# Patient Record
Sex: FEMALE | Race: WHITE | NOT HISPANIC OR LATINO | Employment: OTHER | URBAN - NONMETROPOLITAN AREA
[De-identification: names, ages, dates, MRNs, and addresses within clinical notes are randomized per-mention and may not be internally consistent; named-entity substitution may affect disease eponyms.]

---

## 2019-04-24 ENCOUNTER — TELEPHONE (OUTPATIENT)
Dept: CARDIOLOGY | Facility: CLINIC | Age: 84
End: 2019-04-24

## 2019-04-24 ENCOUNTER — OFFICE VISIT (OUTPATIENT)
Dept: CARDIOLOGY | Facility: CLINIC | Age: 84
End: 2019-04-24

## 2019-04-24 VITALS
BODY MASS INDEX: 24.17 KG/M2 | HEART RATE: 76 BPM | OXYGEN SATURATION: 96 % | DIASTOLIC BLOOD PRESSURE: 82 MMHG | WEIGHT: 128 LBS | SYSTOLIC BLOOD PRESSURE: 174 MMHG | HEIGHT: 61 IN

## 2019-04-24 DIAGNOSIS — R00.2 PALPITATIONS: ICD-10-CM

## 2019-04-24 DIAGNOSIS — Z00.00 HEALTHCARE MAINTENANCE: ICD-10-CM

## 2019-04-24 DIAGNOSIS — I73.9 PVD (PERIPHERAL VASCULAR DISEASE) WITH CLAUDICATION (HCC): ICD-10-CM

## 2019-04-24 DIAGNOSIS — I70.213 ATHEROSCLEROSIS OF NATIVE ARTERY OF BOTH LOWER EXTREMITIES WITH INTERMITTENT CLAUDICATION (HCC): ICD-10-CM

## 2019-04-24 DIAGNOSIS — I10 ESSENTIAL HYPERTENSION: ICD-10-CM

## 2019-04-24 DIAGNOSIS — R53.83 MALAISE AND FATIGUE: ICD-10-CM

## 2019-04-24 DIAGNOSIS — I73.9 PVD (PERIPHERAL VASCULAR DISEASE) (HCC): ICD-10-CM

## 2019-04-24 DIAGNOSIS — I48.0 PAROXYSMAL ATRIAL FIBRILLATION (HCC): Primary | ICD-10-CM

## 2019-04-24 DIAGNOSIS — R60.9 PERIPHERAL EDEMA: ICD-10-CM

## 2019-04-24 DIAGNOSIS — R93.6 ABNORMAL ULTRASOUND OF LOWER EXTREMITY: ICD-10-CM

## 2019-04-24 DIAGNOSIS — R06.02 SHORTNESS OF BREATH: ICD-10-CM

## 2019-04-24 DIAGNOSIS — R53.81 MALAISE AND FATIGUE: ICD-10-CM

## 2019-04-24 PROBLEM — E03.9 HYPOTHYROIDISM: Status: ACTIVE | Noted: 2019-04-24

## 2019-04-24 PROBLEM — Z85.89: Status: ACTIVE | Noted: 2019-04-24

## 2019-04-24 PROCEDURE — 93000 ELECTROCARDIOGRAM COMPLETE: CPT | Performed by: NURSE PRACTITIONER

## 2019-04-24 PROCEDURE — 99204 OFFICE O/P NEW MOD 45 MIN: CPT | Performed by: NURSE PRACTITIONER

## 2019-04-24 RX ORDER — ASCORBIC ACID 500 MG
1000 TABLET ORAL EVERY MORNING
COMMUNITY
End: 2021-04-27

## 2019-04-24 RX ORDER — ATORVASTATIN CALCIUM 20 MG/1
20 TABLET, FILM COATED ORAL DAILY
Qty: 90 TABLET | Refills: 3 | Status: SHIPPED | OUTPATIENT
Start: 2019-04-24 | End: 2020-01-27 | Stop reason: SDUPTHER

## 2019-04-24 RX ORDER — ATENOLOL 25 MG/1
25 TABLET ORAL DAILY
Qty: 30 TABLET | Refills: 11 | Status: SHIPPED | OUTPATIENT
Start: 2019-04-24 | End: 2020-10-01 | Stop reason: SDUPTHER

## 2019-04-24 RX ORDER — SPIRONOLACTONE 25 MG/1
25 TABLET ORAL DAILY
Qty: 90 TABLET | Refills: 3 | Status: SHIPPED | OUTPATIENT
Start: 2019-04-24 | End: 2019-05-27

## 2019-04-24 RX ORDER — AMLODIPINE BESYLATE 5 MG/1
5 TABLET ORAL DAILY
COMMUNITY
End: 2019-05-27

## 2019-04-24 RX ORDER — LOSARTAN POTASSIUM AND HYDROCHLOROTHIAZIDE 25; 100 MG/1; MG/1
1 TABLET ORAL DAILY
COMMUNITY
End: 2019-04-24

## 2019-04-24 RX ORDER — POTASSIUM CHLORIDE 750 MG/1
20 TABLET, FILM COATED, EXTENDED RELEASE ORAL 4 TIMES DAILY
COMMUNITY

## 2019-04-24 RX ORDER — LEVOTHYROXINE SODIUM 0.12 MG/1
125 TABLET ORAL EVERY MORNING
COMMUNITY

## 2019-04-24 RX ORDER — LOSARTAN POTASSIUM 100 MG/1
100 TABLET ORAL DAILY
Qty: 90 TABLET | Refills: 3 | Status: SHIPPED | OUTPATIENT
Start: 2019-04-24 | End: 2019-05-27

## 2019-04-24 NOTE — PROGRESS NOTES
Subjective   Kathleen Allen is a 83 y.o. female     Chief Complaint   Patient presents with   • Establish Care     Here to establish care        HPI    Problem List:    1. Atrial Fibrillation by EKG 19 [id number] 5 Xarelto   2. Hypertension  3. Hyperlipidemia  4. PVD   4.1 BLE arterial US 19 - occlusion of the common femoral artery on the left with no flow to the left posterior tibial artery.  Mod plaque throughout RLE with blunted flow to popliteal.  Decreased inflow to the right lower extremity indicating central arterial disease   5. Shortness of breath  6. H/O lymph node left groin  3 chemo tx's and 30 rad tx's    Patient is an 83-year-old female who presents today to establish care with her  at her side.  She denies any chest pain or pressure.  She says she has occasional flutters which she states occurs usually at the end of the day when she is tired.  She denies any dizziness, presyncope, syncopea or PND.  She does get swelling in her ankles and feet.  Patient says she is short of breath with minimal exertion.  She does get short of breath when she lies down as well.  She says she has really bad pain in her legs.  She says she can only walk very little before her legs really hurt her.      Occupation: Retired, clerical  Smoked 1 PPD for 50 yrs, quit ; denies ETOH and illicit drugs  Denies any soda, occasional glass of tea, 1 reg coffee and 1 decaf/day     Mom 75  - Aneurysm   Dad 67  - MI, patient says her dads entire family had heart disease   Son 60 alive - two MIs fist one in his early 50s      Current Outpatient Medications   Medication Sig Dispense Refill   • amLODIPine (NORVASC) 5 MG tablet Take 5 mg by mouth Daily.     • calcium carbonate-cholecalciferol 500-400 MG-UNIT tablet tablet Take  by mouth Daily.     • levothyroxine (SYNTHROID, LEVOTHROID) 125 MCG tablet Take 125 mcg by mouth Daily.     • potassium chloride (K-DUR) 10 MEQ CR tablet Take 10 mEq by  mouth 2 (Two) Times a Day.     • vitamin C (ASCORBIC ACID) 500 MG tablet Take 1,000 mg by mouth Daily.     • aspirin 81 MG tablet Take 1 tablet by mouth Daily. 30 tablet 11   • atenolol (TENORMIN) 25 MG tablet Take 1 tablet by mouth Daily. 30 tablet 11   • atorvastatin (LIPITOR) 20 MG tablet Take 1 tablet by mouth Daily. 90 tablet 3   • losartan (COZAAR) 100 MG tablet Take 1 tablet by mouth Daily. 90 tablet 3   • rivaroxaban (XARELTO) 20 MG tablet Take 1 tablet by mouth Daily. 90 tablet 3   • spironolactone (ALDACTONE) 25 MG tablet Take 1 tablet by mouth Daily. 90 tablet 3     No current facility-administered medications for this visit.        ALLERGIES    Patient has no known allergies.    Past Medical History:   Diagnosis Date   • Hypertension    • Hypothyroid    • Lymph node cancer (CMS/HCC)     Left groin        Social History     Socioeconomic History   • Marital status:      Spouse name: Not on file   • Number of children: Not on file   • Years of education: Not on file   • Highest education level: Not on file   Tobacco Use   • Smoking status: Former Smoker     Packs/day: 1.00     Years: 50.00     Pack years: 50.00     Types: Cigarettes     Last attempt to quit:      Years since quittin.3   • Smokeless tobacco: Never Used   Substance and Sexual Activity   • Alcohol use: No     Frequency: Never   • Drug use: No   • Sexual activity: Defer       Family History   Problem Relation Age of Onset   • Heart disease Father    • Heart attack Father        Review of Systems   Constitutional: Positive for fatigue (feels tired all the time ). Negative for chills and fever.   HENT: Positive for rhinorrhea (runny nose ). Negative for congestion and sore throat.    Eyes: Positive for visual disturbance (reading glasses ).   Respiratory: Positive for shortness of breath (with minimal exertion or walking small distances and when she lays down or been doing something ). Negative for chest tightness.   "  Cardiovascular: Positive for palpitations (occasional flutters especially when more tired towards the end of the day ) and leg swelling (LE edema, right worse than left ). Negative for chest pain.   Gastrointestinal: Negative for abdominal pain, blood in stool, nausea and vomiting.   Endocrine: Negative for cold intolerance and heat intolerance.   Genitourinary: Negative for dysuria, frequency, hematuria and urgency.   Musculoskeletal: Negative for arthralgias, back pain and neck pain.        Pain when she walks BLE and goes away with rest   Skin: Negative for rash and wound.   Allergic/Immunologic: Negative for environmental allergies and food allergies.   Neurological: Positive for weakness (weakness in left leg ). Negative for dizziness and light-headedness.   Hematological: Bruises/bleeds easily (bruises and bleeds easily ).   Psychiatric/Behavioral: Negative for sleep disturbance (shortness of breath when she lays down ).       Objective   /82 (BP Location: Left arm, Patient Position: Sitting)   Pulse 76   Ht 154.9 cm (61\")   Wt 58.1 kg (128 lb)   SpO2 96%   BMI 24.19 kg/m²   Vitals:    04/24/19 1030   BP: 174/82   BP Location: Left arm   Patient Position: Sitting   Pulse: 76   SpO2: 96%   Weight: 58.1 kg (128 lb)   Height: 154.9 cm (61\")      Lab Results (most recent)     None        Physical Exam   Constitutional: She is oriented to person, place, and time. Vital signs are normal. She appears well-developed and well-nourished. She is active and cooperative.   HENT:   Head: Normocephalic.   Mouth/Throat: She has dentures (upper and lower ).   Eyes: Lids are normal.   Neck: Normal carotid pulses, no hepatojugular reflux and no JVD present. Carotid bruit is not present.   Cardiovascular: Normal rate and normal heart sounds. An irregularly irregular rhythm present.   Pulses:       Radial pulses are 2+ on the right side, and 2+ on the left side.        Dorsalis pedis pulses are 2+ on the right side, " and 2+ on the left side.        Posterior tibial pulses are 2+ on the right side, and 2+ on the left side.   1+ edema BLE   Pulmonary/Chest: Effort normal. She has rales in the right lower field and the left lower field.   Abdominal: Normal appearance and bowel sounds are normal.   Neurological: She is alert and oriented to person, place, and time.   Skin: Skin is warm, dry and intact.   Psychiatric: She has a normal mood and affect. Her speech is normal and behavior is normal. Judgment and thought content normal. Cognition and memory are normal.       Procedure     ECG 12 Lead  Date/Time: 4/24/2019 6:23 PM  Performed by: Barbara Villarreal APRN  Authorized by: Barbara Villarreal APRN   Comparison: not compared with previous ECG   Rhythm: atrial fibrillation  Rate: normal  BPM: 73  Q waves: V1 and V2    QRS axis: right    Clinical impression: abnormal EKG                 Assessment/Plan      Diagnosis Plan   1. Paroxysmal atrial fibrillation (CMS/HCC)  atenolol (TENORMIN) 25 MG tablet    Cardiac Event Monitor    Stress Test With Myocardial Perfusion One Day    Adult Transthoracic Echo Complete W/ Cont if Necessary Per Protocol    rivaroxaban (XARELTO) 20 MG tablet    Magnesium   2. Essential hypertension  Stress Test With Myocardial Perfusion One Day    Adult Transthoracic Echo Complete W/ Cont if Necessary Per Protocol    losartan (COZAAR) 100 MG tablet    Basic Metabolic Panel   3. PVD (peripheral vascular disease) (CMS/HCC)  atorvastatin (LIPITOR) 20 MG tablet    Stress Test With Myocardial Perfusion One Day    Adult Transthoracic Echo Complete W/ Cont if Necessary Per Protocol   4. Shortness of breath  Stress Test With Myocardial Perfusion One Day    Adult Transthoracic Echo Complete W/ Cont if Necessary Per Protocol   5. PVD (peripheral vascular disease) with claudication (CMS/HCC)  Stress Test With Myocardial Perfusion One Day    Adult Transthoracic Echo Complete W/ Cont if Necessary Per Protocol    CT Angio  Abdominal Aorta Bilateral Iliofem Runoff With & Without Contrast    aspirin 81 MG tablet   6. Malaise and fatigue  Stress Test With Myocardial Perfusion One Day    Adult Transthoracic Echo Complete W/ Cont if Necessary Per Protocol   7. Peripheral edema  Stress Test With Myocardial Perfusion One Day    Adult Transthoracic Echo Complete W/ Cont if Necessary Per Protocol    CT Angio Abdominal Aorta Bilateral Iliofem Runoff With & Without Contrast    spironolactone (ALDACTONE) 25 MG tablet   8. Palpitations  Cardiac Event Monitor    Stress Test With Myocardial Perfusion One Day    Adult Transthoracic Echo Complete W/ Cont if Necessary Per Protocol    Magnesium   9. Abnormal ultrasound of lower extremity  Stress Test With Myocardial Perfusion One Day    Adult Transthoracic Echo Complete W/ Cont if Necessary Per Protocol    CT Angio Abdominal Aorta Bilateral Iliofem Runoff With & Without Contrast   10. Atherosclerosis of native artery of both lower extremities with intermittent claudication (CMS/HCC)   CT Angio Abdominal Aorta Bilateral Iliofem Runoff With & Without Contrast   11. Healthcare maintenance  Basic Metabolic Panel    Magnesium       Return in about 10 weeks (around 7/3/2019).  A. fib/hypertension/PVD/shortness of breath/fatigue/peripheral edema/palpitations-patient will have ischemia work-up, stress and echocardiogram.  Will start aspirin 81.  She will also start Lipitor 20.  PVD/abnormal ultrasound of lower extremities-patient will have CTA of abdomen with runoff.  Excisional A. fib-patient will start Xarelto and atenolol.  We will continue her medication regimen otherwise.  She will follow-up in 10 weeks or sooner if any changes.  We will re-assess her cholesterol at that time.  Patient's potassium was a 3 when she had her blood work done yesterday at her PCPs therefore I am going to stop her HCTZ portion of her combination pill and I will start her on Aldactone.  I do not want to give her Lasix at this  time due to her potassium being so low.  We will reassess a BMP and magnesium in 1 week if they are within normal limits I will give her some Lasix use as needed.           Patient's Body mass index is 24.19 kg/m². BMI is within normal parameters. No follow-up required..      Electronically signed by:

## 2019-04-24 NOTE — TELEPHONE ENCOUNTER
[facility] pharmacist calling to report ALERT flagged on medication sent today SPIRONOLACTONE 25 MG TABLET DAILY, patient is taking POTASSIUM CHLORIDE 10 MEQ CR tablet BID.  Pharmacy is required to call to make your aware and get an okay to dispense.  Please advise.  MIRIAM VITALE

## 2019-04-24 NOTE — PATIENT INSTRUCTIONS
Peripheral Vascular Disease  Peripheral vascular disease (PVD) is a disease of the blood vessels that are not part of your heart and brain. A simple term for PVD is poor circulation. In most cases, PVD narrows the blood vessels that carry blood from your heart to the rest of your body. This can reduce the supply of blood to your arms, legs, and internal organs, like your stomach or kidneys. However, PVD most often affects a person’s lower legs and feet. Without treatment, PVD tends to get worse.  PVD can also lead to acute ischemic limb. This is when an arm or leg suddenly cannot get enough blood. This is a medical emergency.  Follow these instructions at home:  Lifestyle  · Do not use any products that contain nicotine or tobacco, such as cigarettes and e-cigarettes. If you need help quitting, ask your doctor.  · Lose weight if you are overweight. Or, stay at a healthy weight as told by your doctor.  · Eat a diet that is low in fat and cholesterol. If you need help, ask your doctor.  · Exercise regularly. Ask your doctor for activities that are right for you.  General instructions  · Take over-the-counter and prescription medicines only as told by your doctor.  · Take good care of your feet:  ? Wear comfortable shoes that fit well.  ? Check your feet often for any cuts or sores.  · Keep all follow-up visits as told by your doctor This is important.  Contact a doctor if:  · You have cramps in your legs when you walk.  · You have leg pain when you are at rest.  · You have coldness in a leg or foot.  · Your skin changes.  · You are unable to get or have an erection (erectile dysfunction).  · You have cuts or sores on your feet that do not heal.  Get help right away if:  · Your arm or leg turns cold, numb, and blue.  · Your arms or legs become red, warm, swollen, painful, or numb.  · You have chest pain.  · You have trouble breathing.  · You suddenly have weakness in your face, arm, or leg.  · You become very confused  or you cannot speak.  · You suddenly have a very bad headache.  · You suddenly cannot see.  Summary  · Peripheral vascular disease (PVD) is a disease of the blood vessels.  · A simple term for PVD is poor circulation. Without treatment, PVD tends to get worse.  · Treatment may include exercise, low fat and low cholesterol diet, and quitting smoking.  This information is not intended to replace advice given to you by your health care provider. Make sure you discuss any questions you have with your health care provider.  Document Released: 03/14/2011 Document Revised: 01/25/2018 Document Reviewed: 01/25/2018  [employer] Interactive Patient Education © 2019 [employer]  Atrial Fibrillation  Atrial fibrillation is a type of irregular or rapid heartbeat (arrhythmia). In atrial fibrillation, the heart quivers continuously in a chaotic pattern. This occurs when parts of the heart receive disorganized signals that make the heart unable to pump blood normally. This can increase the risk for stroke, heart failure, and other heart-related conditions. There are different types of atrial fibrillation, including:  · Paroxysmal atrial fibrillation. This type starts suddenly, and it usually stops on its own shortly after it starts.  · Persistent atrial fibrillation. This type often lasts longer than a week. It may stop on its own or with treatment.  · Long-lasting persistent atrial fibrillation. This type lasts longer than 12 months.  · Permanent atrial fibrillation. This type does not go away.    Talk with your health care provider to learn about the type of atrial fibrillation that you have.  What are the causes?  This condition is caused by some heart-related conditions or procedures, including:  · A heart attack.  · Coronary artery disease.  · Heart failure.  · Heart valve conditions.  · High blood pressure.  · Inflammation of the sac that surrounds the heart (pericarditis).  · Heart surgery.  · Certain heart rhythm disorders,  such as Sherman-Parkinson-White syndrome.    Other causes include:  · Pneumonia.  · Obstructive sleep apnea.  · Blockage of an artery in the lungs (pulmonary embolism, or PE).  · Lung cancer.  · Chronic lung disease.  · Thyroid problems, especially if the thyroid is overactive (hyperthyroidism).  · Caffeine.  · Excessive alcohol use or illegal drug use.  · Use of some medicines, including certain decongestants and diet pills.    Sometimes, the cause cannot be found.  What increases the risk?  This condition is more likely to develop in:  · People who are older in age.  · People who smoke.  · People who have diabetes mellitus.  · People who are overweight (obese).  · Athletes who exercise vigorously.    What are the signs or symptoms?  Symptoms of this condition include:  · A feeling that your heart is beating rapidly or irregularly.  · A feeling of discomfort or pain in your chest.  · Shortness of breath.  · Sudden light-headedness or weakness.  · Getting tired easily during exercise.    In some cases, there are no symptoms.  How is this diagnosed?  Your health care provider may be able to detect atrial fibrillation when taking your pulse. If detected, this condition may be diagnosed with:  · An electrocardiogram (ECG).  · A Holter monitor test that records your heartbeat patterns over a 24-hour period.  · Transthoracic echocardiogram (TTE) to evaluate how blood flows through your heart.  · Transesophageal echocardiogram (FLORENCIO) to view more detailed images of your heart.  · A stress test.  · Imaging tests, such as a CT scan or chest X-ray.  · Blood tests.    How is this treated?  The main goals of treatment are to prevent blood clots from forming and to keep your heart beating at a normal rate and rhythm. The type of treatment that you receive depends on many factors, such as your underlying medical conditions and how you feel when you are experiencing atrial fibrillation.  This condition may be treated  with:  · Medicine to slow down the heart rate, bring the heart’s rhythm back to normal, or prevent clots from forming.  · Electrical cardioversion. This is a procedure that resets your heart’s rhythm by delivering a controlled, low-energy shock to the heart through your skin.  · Different types of ablation, such as catheter ablation, catheter ablation with pacemaker, or surgical ablation. These procedures destroy the heart tissues that send abnormal signals. When the pacemaker is used, it is placed under your skin to help your heart beat in a regular rhythm.    Follow these instructions at home:  · Take over-the counter and prescription medicines only as told by your health care provider.  · If your health care provider prescribed a blood-thinning medicine (anticoagulant), take it exactly as told. Taking too much blood-thinning medicine can cause bleeding. If you do not take enough blood-thinning medicine, you will not have the protection that you need against stroke and other problems.  · Do not use tobacco products, including cigarettes, chewing tobacco, and e-cigarettes. If you need help quitting, ask your health care provider.  · If you have obstructive sleep apnea, manage your condition as told by your health care provider.  · Do not drink alcohol.  · Do not drink beverages that contain caffeine, such as coffee, soda, and tea.  · Maintain a healthy weight. Do not use diet pills unless your health care provider approves. Diet pills may make heart problems worse.  · Follow diet instructions as told by your health care provider.  · Exercise regularly as told by your health care provider.  · Keep all follow-up visits as told by your health care provider. This is important.  How is this prevented?  · Avoid drinking beverages that contain caffeine or alcohol.  · Avoid certain medicines, especially medicines that are used for breathing problems.  · Avoid certain herbs and herbal medicines, such as those that contain  ephedra or ginseng.  · Do not use illegal drugs, such as cocaine and amphetamines.  · Do not smoke.  · Manage your high blood pressure.  Contact a health care provider if:  · You notice a change in the rate, rhythm, or strength of your heartbeat.  · You are taking an anticoagulant and you notice increased bruising.  · You tire more easily when you exercise or exert yourself.  Get help right away if:  · You have chest pain, abdominal pain, sweating, or weakness.  · You feel nauseous.  · You notice blood in your vomit, bowel movement, or urine.  · You have shortness of breath.  · You suddenly have swollen feet and ankles.  · You feel dizzy.  · You have sudden weakness or numbness of the face, arm, or leg, especially on one side of the body.  · You have trouble speaking, trouble understanding, or both (aphasia).  · Your face or your eyelid droops on one side.  These symptoms may represent a serious problem that is an emergency. Do not wait to see if the symptoms will go away. Get medical help right away. Call your local emergency services (911 in the [place] Do not drive yourself to the hospital.  This information is not intended to replace advice given to you by your health care provider. Make sure you discuss any questions you have with your health care provider.  Document Released: 12/18/2006 Document Revised: 04/26/2017 Document Reviewed: 04/13/2016  [employer] Interactive Patient Education © 2019 [employer]

## 2019-04-25 PROBLEM — R93.6 ABNORMAL ULTRASOUND OF LOWER EXTREMITY: Status: ACTIVE | Noted: 2019-04-25

## 2019-04-25 PROBLEM — R60.9 PERIPHERAL EDEMA: Status: ACTIVE | Noted: 2019-04-25

## 2019-04-25 PROBLEM — I70.213 ATHEROSCLEROSIS OF NATIVE ARTERY OF BOTH LOWER EXTREMITIES WITH INTERMITTENT CLAUDICATION (HCC): Status: ACTIVE | Noted: 2019-04-25

## 2019-04-25 PROBLEM — R00.2 PALPITATIONS: Status: ACTIVE | Noted: 2019-04-25

## 2019-04-26 NOTE — TELEPHONE ENCOUNTER
Rec'd a fax from [facility] in [place] requesting that Xarelto be changed to something else due to the cost. Patient copay is $614.33.  Per KHARI Juárez, will send in Eliquis 5mg. Bid to see if this will be any less expensive. BRITTA CHÁVEZA

## 2019-04-30 ENCOUNTER — TELEPHONE (OUTPATIENT)
Dept: CARDIOLOGY | Facility: CLINIC | Age: 84
End: 2019-04-30

## 2019-04-30 DIAGNOSIS — R93.89 ABNORMAL COMPUTED TOMOGRAPHY ANGIOGRAPHY (CTA): Primary | ICD-10-CM

## 2019-04-30 RX ORDER — FUROSEMIDE 20 MG/1
TABLET ORAL
Qty: 30 TABLET | Refills: 11 | Status: SHIPPED | OUTPATIENT
Start: 2019-04-30 | End: 2019-05-27

## 2019-04-30 NOTE — TELEPHONE ENCOUNTER
Received pt's CTA w/ runoff, impression shows high grade stenosis.     Requested lab results from hospital:  Potassium: 5.0  Creat: 0.75  Rest of results are being faxed to the office.   Sodium is low, have pt increase sodium intake.     Per Barbara:   Do sx and BP check. If pt's BP is okay, 20mg Lasix x 3days and PRN edema thereafter. Send referral to Dr. Maki cardiovascular surgeon regd stenosis.                    Informed pt of her CTA results and her lab results, she verbalized understanding.     States PCP put her on Warfarin instead of Eliquis, she does not know her warfarin dose but stated she would call and let us know.   BP has been around 166/87, HR in 50's.   BLE edema, mainly RLE. Has improved some. Breathing okay when relax, SoA w/ activity.  (Made Barbara aware)    Confirmed px w/ pt and sent in Lasix. Pt is aware that she should receive a call to schedule w/ Dr. Maki.  She stated that heart monitor hasn;t arrived yet. Stated they called her to confirm address, but hasn't heard anything since. I informed her that monitor is coming from [place] and to call if it hasn't arrived by the end of this week. Told her to call w/ any further questions or concerns.

## 2019-05-01 ENCOUNTER — TELEPHONE (OUTPATIENT)
Dept: CARDIOLOGY | Facility: CLINIC | Age: 84
End: 2019-05-01

## 2019-05-01 RX ORDER — WARFARIN SODIUM 5 MG/1
2.5 TABLET ORAL NIGHTLY
Status: ON HOLD | COMMUNITY
End: 2019-05-28 | Stop reason: SDUPTHER

## 2019-05-01 NOTE — TELEPHONE ENCOUNTER
----- Message from IVONNE Newby sent at 4/30/2019  6:17 PM EDT -----  Forgot, can you also please advise her she needs to get a copy of the CTA of abd with run-off on CD that she had done at [facility] to take to her appt when she sees Dr. Maki.

## 2019-05-01 NOTE — TELEPHONE ENCOUNTER
Informed pt of the message from Barbara, she verbalized understanding.     Pt informed me that her Coumadin is 5mg daily (updated rx list).

## 2019-05-06 ENCOUNTER — HOSPITAL ENCOUNTER (OUTPATIENT)
Dept: CARDIOLOGY | Facility: HOSPITAL | Age: 84
Discharge: HOME | End: 2019-05-06

## 2019-05-06 DIAGNOSIS — R53.83 MALAISE AND FATIGUE: ICD-10-CM

## 2019-05-06 DIAGNOSIS — R00.2 PALPITATIONS: ICD-10-CM

## 2019-05-06 DIAGNOSIS — I10 ESSENTIAL HYPERTENSION: ICD-10-CM

## 2019-05-06 DIAGNOSIS — R60.9 PERIPHERAL EDEMA: ICD-10-CM

## 2019-05-06 DIAGNOSIS — R06.02 SHORTNESS OF BREATH: ICD-10-CM

## 2019-05-06 DIAGNOSIS — I73.9 PVD (PERIPHERAL VASCULAR DISEASE) WITH CLAUDICATION (HCC): ICD-10-CM

## 2019-05-06 DIAGNOSIS — R93.6 ABNORMAL ULTRASOUND OF LOWER EXTREMITY: ICD-10-CM

## 2019-05-06 DIAGNOSIS — R53.81 MALAISE AND FATIGUE: ICD-10-CM

## 2019-05-06 DIAGNOSIS — I48.0 PAROXYSMAL ATRIAL FIBRILLATION (HCC): ICD-10-CM

## 2019-05-06 DIAGNOSIS — I73.9 PVD (PERIPHERAL VASCULAR DISEASE) (HCC): ICD-10-CM

## 2019-05-06 PROCEDURE — 0 TECHNETIUM SESTAMIBI: Performed by: INTERNAL MEDICINE

## 2019-05-06 PROCEDURE — A9500 TC99M SESTAMIBI: HCPCS | Performed by: INTERNAL MEDICINE

## 2019-05-06 PROCEDURE — 78452 HT MUSCLE IMAGE SPECT MULT: CPT | Performed by: INTERNAL MEDICINE

## 2019-05-06 PROCEDURE — 93017 CV STRESS TEST TRACING ONLY: CPT

## 2019-05-06 PROCEDURE — 93306 TTE W/DOPPLER COMPLETE: CPT | Performed by: INTERNAL MEDICINE

## 2019-05-06 PROCEDURE — 25010000002 REGADENOSON 0.4 MG/5ML SOLUTION: Performed by: INTERNAL MEDICINE

## 2019-05-06 PROCEDURE — 93306 TTE W/DOPPLER COMPLETE: CPT

## 2019-05-06 PROCEDURE — 78452 HT MUSCLE IMAGE SPECT MULT: CPT

## 2019-05-06 PROCEDURE — 93018 CV STRESS TEST I&R ONLY: CPT | Performed by: INTERNAL MEDICINE

## 2019-05-06 RX ADMIN — TECHNETIUM TC 99M SESTAMIBI 1 DOSE: 1 INJECTION INTRAVENOUS at 10:45

## 2019-05-06 RX ADMIN — REGADENOSON 0.4 MG: 0.08 INJECTION, SOLUTION INTRAVENOUS at 10:58

## 2019-05-06 RX ADMIN — TECHNETIUM TC 99M SESTAMIBI 1 DOSE: 1 INJECTION INTRAVENOUS at 10:58

## 2019-05-07 ENCOUNTER — TELEPHONE (OUTPATIENT)
Dept: CARDIOLOGY | Facility: CLINIC | Age: 84
End: 2019-05-07

## 2019-05-07 NOTE — TELEPHONE ENCOUNTER
Patient calling to say she has not received heart monitor.  She called [facility] last week and they told her it would arrive by Friday.  The patient is scheduled with Dr. Maki 5/13/19.  Pt said Dr. Maki said not to bother with wearing it.  Pt said to cancel order.  IAM,MIRIAM

## 2019-05-08 LAB
BH CV STRESS COMMENTS STAGE 1: NORMAL
BH CV STRESS DOSE REGADENOSON STAGE 1: 0.4
BH CV STRESS DURATION MIN STAGE 1: 0
BH CV STRESS DURATION SEC STAGE 1: 10
BH CV STRESS PROTOCOL 1: NORMAL
BH CV STRESS RECOVERY BP: NORMAL MMHG
BH CV STRESS RECOVERY HR: 54 BPM
BH CV STRESS STAGE 1: 1
MAXIMAL PREDICTED HEART RATE: 138 BPM
PERCENT MAX PREDICTED HR: 40.58 %
STRESS BASELINE BP: NORMAL MMHG
STRESS BASELINE HR: 57 BPM
STRESS PERCENT HR: 48 %
STRESS POST PEAK BP: NORMAL MMHG
STRESS POST PEAK HR: 56 BPM
STRESS TARGET HR: 117 BPM

## 2019-05-09 ENCOUNTER — TELEPHONE (OUTPATIENT)
Dept: CARDIOLOGY | Facility: CLINIC | Age: 84
End: 2019-05-09

## 2019-05-09 DIAGNOSIS — I27.20 PULMONARY HTN (HCC): Primary | ICD-10-CM

## 2019-05-09 LAB
BH CV ECHO MEAS - ACS: 1.6 CM
BH CV ECHO MEAS - AO MEAN PG: 1.8 MMHG
BH CV ECHO MEAS - AO ROOT AREA (BSA CORRECTED): 2.2
BH CV ECHO MEAS - AO ROOT AREA: 9 CM^2
BH CV ECHO MEAS - AO ROOT DIAM: 3.4 CM
BH CV ECHO MEAS - AO V2 MEAN: 63 CM/SEC
BH CV ECHO MEAS - AO V2 VTI: 19.5 CM
BH CV ECHO MEAS - BSA(HAYCOCK): 1.6 M^2
BH CV ECHO MEAS - BSA: 1.6 M^2
BH CV ECHO MEAS - BZI_BMI: 24.2 KILOGRAMS/M^2
BH CV ECHO MEAS - BZI_METRIC_HEIGHT: 154.9 CM
BH CV ECHO MEAS - BZI_METRIC_WEIGHT: 58.1 KG
BH CV ECHO MEAS - EDV(CUBED): 18.9 ML
BH CV ECHO MEAS - EDV(MOD-SP4): 23 ML
BH CV ECHO MEAS - EDV(TEICH): 26.2 ML
BH CV ECHO MEAS - EF(CUBED): 66.9 %
BH CV ECHO MEAS - EF(MOD-SP4): 52.2 %
BH CV ECHO MEAS - EF(TEICH): 60.5 %
BH CV ECHO MEAS - ESV(CUBED): 6.3 ML
BH CV ECHO MEAS - ESV(MOD-SP4): 11 ML
BH CV ECHO MEAS - ESV(TEICH): 10.3 ML
BH CV ECHO MEAS - FS: 30.9 %
BH CV ECHO MEAS - IVS/LVPW: 0.66
BH CV ECHO MEAS - IVSD: 1.1 CM
BH CV ECHO MEAS - LA DIMENSION: 4 CM
BH CV ECHO MEAS - LA/AO: 1.2
BH CV ECHO MEAS - LV DIASTOLIC VOL/BSA (35-75): 14.7 ML/M^2
BH CV ECHO MEAS - LV IVRT: 0.12 SEC
BH CV ECHO MEAS - LV MASS(C)D: 113.1 GRAMS
BH CV ECHO MEAS - LV MASS(C)DI: 72.4 GRAMS/M^2
BH CV ECHO MEAS - LV SYSTOLIC VOL/BSA (12-30): 7 ML/M^2
BH CV ECHO MEAS - LVIDD: 2.7 CM
BH CV ECHO MEAS - LVIDS: 1.8 CM
BH CV ECHO MEAS - LVLD AP4: 5.2 CM
BH CV ECHO MEAS - LVLS AP4: 4.4 CM
BH CV ECHO MEAS - LVOT AREA (M): 3.1 CM^2
BH CV ECHO MEAS - LVOT AREA: 3.1 CM^2
BH CV ECHO MEAS - LVOT DIAM: 2 CM
BH CV ECHO MEAS - LVPWD: 1.6 CM
BH CV ECHO MEAS - MV DEC SLOPE: 412.1 CM/SEC^2
BH CV ECHO MEAS - MV E MAX VEL: 93.3 CM/SEC
BH CV ECHO MEAS - RAP SYSTOLE: 10 MMHG
BH CV ECHO MEAS - RVDD: 2.8 CM
BH CV ECHO MEAS - RVSP: 65.3 MMHG
BH CV ECHO MEAS - SI(AO): 111.8 ML/M^2
BH CV ECHO MEAS - SI(CUBED): 8.1 ML/M^2
BH CV ECHO MEAS - SI(MOD-SP4): 7.7 ML/M^2
BH CV ECHO MEAS - SI(TEICH): 10.1 ML/M^2
BH CV ECHO MEAS - SV(AO): 174.7 ML
BH CV ECHO MEAS - SV(CUBED): 12.7 ML
BH CV ECHO MEAS - SV(MOD-SP4): 12 ML
BH CV ECHO MEAS - SV(TEICH): 15.8 ML
BH CV ECHO MEAS - TR MAX VEL: 371.9 CM/SEC
MAXIMAL PREDICTED HEART RATE: 138 BPM
STRESS TARGET HR: 117 BPM

## 2019-05-09 NOTE — TELEPHONE ENCOUNTER
Informed pt of her results and the message from Barbara, pt verbalized understanding.   Pt asked what is being done about her legs, states she's not concerned about her lungs. I reiterated that there was stenosis seen, which is why we referred her to a cardiothoracic surgeon. States we do need to get her in w/ pulmonology for her pulmonary HTN, she verbalized understanding. I let her know that someone would call to schedule.

## 2019-05-09 NOTE — TELEPHONE ENCOUNTER
Echo:  4.  Pulmonary artery systolic pressures are estimated at 60 mmHg.  Estimated EF appears to be in the range of 56 - 60%.         Stress:  1.  No scintigraphic evidence of ischemia.     2.  Preserved post stress ejection fraction of 71% with no focal wall motion abnormalities.     3.  No evidence of pharmacologically-induced ischemic dilation nor of increased lung uptake of radiopharmaceutical.

## 2019-05-09 NOTE — TELEPHONE ENCOUNTER
----- Message from IVONNE Newby sent at 5/9/2019  9:06 AM EDT -----  Patient has sig elevated pulmonary pressures, if she does not see pulmonary refer her to Dr. Francisco or Dr. Cooper for Pulmonary Hypertension.

## 2019-05-10 ENCOUNTER — TELEPHONE (OUTPATIENT)
Dept: CARDIOLOGY | Facility: CLINIC | Age: 84
End: 2019-05-10

## 2019-05-10 NOTE — TELEPHONE ENCOUNTER
Received critical notification on pt's monitor. 5/8/19 at 3:08pm shows atrial flutter.       Per Babrara, do sx check.         Spoke w/ pt, she stated that she doesn't remember what she was doing at that date and time and denies having any sx that she's aware of. I informed her that we will call again if we receive any other notifications. Also told her to call w/ any issues or concerns, she verbalized understanding.

## 2019-05-13 ENCOUNTER — TELEPHONE (OUTPATIENT)
Dept: CARDIOLOGY | Facility: CLINIC | Age: 84
End: 2019-05-13

## 2019-05-13 NOTE — TELEPHONE ENCOUNTER
FAX RECEIVED FROM [facility], CRITICAL CARDIAC REPORT ON THE DESK OF IVONNE CAT FOR REVIEW.  KH,CMA

## 2019-05-14 DIAGNOSIS — Z00.6 EXAMINATION FOR NORMAL COMPARISON FOR CLINICAL RESEARCH: Primary | ICD-10-CM

## 2019-05-20 ENCOUNTER — APPOINTMENT (OUTPATIENT)
Dept: CARDIOLOGY | Facility: HOSPITAL | Age: 84
End: 2019-05-20

## 2019-05-20 ENCOUNTER — OFFICE VISIT (OUTPATIENT)
Dept: CARDIAC SURGERY | Facility: CLINIC | Age: 84
End: 2019-05-20

## 2019-05-20 VITALS
TEMPERATURE: 97.4 F | HEART RATE: 57 BPM | BODY MASS INDEX: 24.17 KG/M2 | OXYGEN SATURATION: 97 % | DIASTOLIC BLOOD PRESSURE: 80 MMHG | WEIGHT: 128 LBS | HEIGHT: 61 IN | SYSTOLIC BLOOD PRESSURE: 150 MMHG

## 2019-05-20 DIAGNOSIS — I73.9 PERIPHERAL ARTERIAL DISEASE (HCC): Primary | ICD-10-CM

## 2019-05-20 PROCEDURE — 99205 OFFICE O/P NEW HI 60 MIN: CPT | Performed by: THORACIC SURGERY (CARDIOTHORACIC VASCULAR SURGERY)

## 2019-05-20 RX ORDER — PREDNISONE 10 MG/1
TABLET ORAL DAILY
COMMUNITY
Start: 2019-05-15 | End: 2019-05-27

## 2019-05-20 RX ORDER — TORSEMIDE 20 MG/1
40 TABLET ORAL 2 TIMES DAILY
COMMUNITY
Start: 2019-05-15

## 2019-05-20 RX ORDER — LOSARTAN POTASSIUM AND HYDROCHLOROTHIAZIDE 25; 100 MG/1; MG/1
1 TABLET ORAL EVERY MORNING
COMMUNITY
End: 2020-01-27 | Stop reason: ALTCHOICE

## 2019-05-20 NOTE — PROGRESS NOTES
05/20/2019  Patient Information  Kathleen Allen                                                                                          [id number]  [place] [address]   1935  'PCP/Referring Physician'  Steff Tellez MD  [phone]  Barbara Villarreal, APRN  [phone]  Chief Complaint   Patient presents with   • Consult     NP per Barbara LUCIA for PAD-Complains of Bilateral Claudication       History of Present Illness:   This patient has a 50-year history of smoking but has not smoked now in a number of years.  She has had pain in both legs when she walks but primarily her pain is in the left calf.  She states that it hurts if she walks as little as 100 feet.  She states she cannot walk up any hill or incline secondary to pain. When she tries to rest, it takes 10 or 15 minutes for the pain to resolve.  She says this has been going on for 3 months and has worsened significantly in the last 30 days.  CT angiogram demonstrates left superficial femoral artery occlusion and significant stenosis in the right external iliac artery.  She denies slow healing or nonhealing ulcers.      Patient Active Problem List   Diagnosis   • Hypothyroidism   • H/O lymph node cancer   • Paroxysmal atrial fibrillation (CMS/HCC)   • Essential hypertension   • PVD (peripheral vascular disease) with claudication (CMS/HCC)   • Malaise and fatigue   • Shortness of breath   • Atherosclerosis of native artery of both lower extremities with intermittent claudication (CMS/HCC)    • Abnormal ultrasound of lower extremity   • Palpitations   • Peripheral edema   • Peripheral arterial disease (CMS/HCC)     Past Medical History:   Diagnosis Date   • Hypertension    • Hypothyroid    • Lymph node cancer (CMS/HCC)     Left groin-Lymphoma   • Peripheral vascular disease (CMS/HCC)    • Pneumonia      Past Surgical History:   Procedure Laterality Date   • APPENDECTOMY     • KNEE SURGERY Left    • OTHER SURGICAL HISTORY Left     Left  Groin for Lymphoma       Current Outpatient Medications:   •  aspirin 81 MG tablet, Take 1 tablet by mouth Daily., Disp: 30 tablet, Rfl: 11  •  atenolol (TENORMIN) 25 MG tablet, Take 1 tablet by mouth Daily., Disp: 30 tablet, Rfl: 11  •  atorvastatin (LIPITOR) 20 MG tablet, Take 1 tablet by mouth Daily., Disp: 90 tablet, Rfl: 3  •  calcium carbonate-cholecalciferol 500-400 MG-UNIT tablet tablet, Take  by mouth Daily., Disp: , Rfl:   •  furosemide (LASIX) 20 MG tablet, Take 1 tab PO daily x 3 days, PRN for edema thereafter, Disp: 30 tablet, Rfl: 11  •  levothyroxine (SYNTHROID, LEVOTHROID) 125 MCG tablet, Take 125 mcg by mouth Daily., Disp: , Rfl:   •  losartan-hydrochlorothiazide (HYZAAR) 100-25 MG per tablet, Take 1 tablet by mouth Daily., Disp: , Rfl:   •  predniSONE (DELTASONE) 10 MG tablet, Daily., Disp: , Rfl:   •  spironolactone (ALDACTONE) 25 MG tablet, Take 1 tablet by mouth Daily., Disp: 90 tablet, Rfl: 3  •  torsemide (DEMADEX) 20 MG tablet, As Needed., Disp: , Rfl:   •  vitamin C (ASCORBIC ACID) 500 MG tablet, Take 1,000 mg by mouth Daily., Disp: , Rfl:   •  warfarin (COUMADIN) 5 MG tablet, Take 5 mg by mouth Daily., Disp: , Rfl:   •  amLODIPine (NORVASC) 5 MG tablet, Take 5 mg by mouth Daily., Disp: , Rfl:   •  losartan (COZAAR) 100 MG tablet, Take 1 tablet by mouth Daily., Disp: 90 tablet, Rfl: 3  •  potassium chloride (K-DUR) 10 MEQ CR tablet, Take 10 mEq by mouth 2 (Two) Times a Day., Disp: , Rfl:   Allergies   Allergen Reactions   • Sulfa Antibiotics Rash     Social History     Socioeconomic History   • Marital status:      Spouse name: Not on file   • Number of children: 5   • Years of education: Not on file   • Highest education level: Not on file   Occupational History   • Occupation: Office and Clerical     Comment: Retired   Tobacco Use   • Smoking status: Former Smoker     Packs/day: 1.00     Years: 50.00     Pack years: 50.00     Types: Cigarettes     Last attempt to quit: 2008      "Years since quittin.3   • Smokeless tobacco: Never Used   Substance and Sexual Activity   • Alcohol use: No     Frequency: Never   • Drug use: No   • Sexual activity: Defer   Social History Narrative    Lives in [place].      Family History   Problem Relation Age of Onset   • Heart disease Father    • Heart attack Father      Review of Systems   Constitution: Positive for night sweats. Negative for chills, fever, malaise/fatigue and weight loss.   HENT: Negative for hearing loss, odynophagia and sore throat.    Cardiovascular: Positive for claudication, dyspnea on exertion and leg swelling. Negative for chest pain, orthopnea and palpitations.   Respiratory: Positive for shortness of breath. Negative for cough and hemoptysis.    Endocrine: Negative for cold intolerance, heat intolerance, polydipsia, polyphagia and polyuria.   Hematologic/Lymphatic: Bruises/bleeds easily.   Skin: Positive for rash. Negative for itching.   Musculoskeletal: Negative for joint pain, joint swelling and myalgias.   Gastrointestinal: Positive for constipation. Negative for abdominal pain, diarrhea, hematemesis, hematochezia, melena, nausea and vomiting.   Genitourinary: Negative for dysuria, frequency and hematuria.   Neurological: Negative for focal weakness, headaches, numbness and seizures.   Psychiatric/Behavioral: Negative for suicidal ideas.   All other systems reviewed and are negative.    Vitals:    19 0924   BP: 150/80   BP Location: Left arm   Patient Position: Sitting   Pulse: 57   Temp: 97.4 °F (36.3 °C)   SpO2: 97%   Weight: 58.1 kg (128 lb)   Height: 154.9 cm (61\")      Physical Exam   CONSTITUTIONAL: Alert and conversant, Well dressed, Well nourished, No acute distress  EYES: Sclera clean, Anicteric, Pupils equal  ENT: No nasal deviation, Trachea midline  NECK: No neck masses, Supple  LUNGS: No wheezing, Cough, non-congested  HEART: No rubs, No murmurs  GASTROINTESTINAL: Soft, non-distended, No masses, Non " tender  to palpation, normal bowel sounds  NEURO: No motor deficits, No sensory deficits, Cranial Nerves 2 through 12 grossly intact  PSYCHIATRIC: Oriented to person, place and time, No memory deficits, Mood appropriate  VASCULAR: No carotid bruits, Femoral pulse is very weak on the right and somewhat stronger on the left.  I am unable to palpate posterior tibial or dorsalis pedis pulses in either lower extremity  MUSKULOSKELETAL: No extremity trauma or extremity asymmetry    Labs/Imaging:   I reviewed the CT angiogram demonstrating right iliac stenosis.    Assessment/Plan:   This patient has a long smoking history and severely symptomatic left lower extremity vascular disease and to a lesser extent right lower extremity symptomatology.  CT scan demonstrates right iliac stenosis and left superficial femoral artery occlusion.  We will plan on an aortogram with complete runoff.  We will proceed with a right femoral cannulation and potentially correct the right iliac stenosis.  If the left leg requires a femoral to popliteal bypass, we would probably return and repair that at another time.  Patient is aware of the risk of bleeding, infection, contrast reaction, nephrotoxicity, death and limb loss and agrees to proceed.  Contrast dye will be used with radiographic imaging to more accurately define and/or treat the arterial or venous obstruction.  The administration of contrast agents may pose a risk of nephrotoxicity (kidney damage) and/or allergic reactions that may be severe but rarely life-threatening.      Patient Active Problem List   Diagnosis   • Hypothyroidism   • H/O lymph node cancer   • Paroxysmal atrial fibrillation (CMS/HCC)   • Essential hypertension   • PVD (peripheral vascular disease) with claudication (CMS/HCC)   • Malaise and fatigue   • Shortness of breath   • Atherosclerosis of native artery of both lower extremities with intermittent claudication (CMS/HCC)    • Abnormal ultrasound of lower  extremity   • Palpitations   • Peripheral edema   • Peripheral arterial disease (CMS/HCC)     CC: MD Kristie Sinclair MD Debbie Moore, , editing for Keyur Maki M.D.    I, Keyur Maki MD, have read and agree with the editing done by Jessica Quiroz, .

## 2019-05-21 ENCOUNTER — OUTSIDE FACILITY SERVICE (OUTPATIENT)
Dept: CARDIOLOGY | Facility: CLINIC | Age: 84
End: 2019-05-21

## 2019-05-21 DIAGNOSIS — I73.9 PVD (PERIPHERAL VASCULAR DISEASE) (HCC): Primary | ICD-10-CM

## 2019-05-21 PROCEDURE — 93228 REMOTE 30 DAY ECG REV/REPORT: CPT | Performed by: INTERNAL MEDICINE

## 2019-05-22 PROBLEM — I73.9 PVD (PERIPHERAL VASCULAR DISEASE) (HCC): Status: ACTIVE | Noted: 2019-05-21

## 2019-05-23 ENCOUNTER — PREP FOR SURGERY (OUTPATIENT)
Dept: OTHER | Facility: HOSPITAL | Age: 84
End: 2019-05-23

## 2019-05-23 DIAGNOSIS — I73.9 PAD (PERIPHERAL ARTERY DISEASE) (HCC): Primary | ICD-10-CM

## 2019-05-24 ENCOUNTER — TELEPHONE (OUTPATIENT)
Dept: CARDIOLOGY | Facility: CLINIC | Age: 84
End: 2019-05-24

## 2019-05-24 NOTE — TELEPHONE ENCOUNTER
Received pt's monitor results, baseline HR 60.1 BPM. 3 critical, 0 serious, and 10 stable events.     Per Barbara, keep follow up appt on 7/25/19 as results have already been addressed.      First attempt to reach pt. Number way busy, no option to leave a message.

## 2019-05-27 ENCOUNTER — APPOINTMENT (OUTPATIENT)
Dept: PREADMISSION TESTING | Facility: HOSPITAL | Age: 84
End: 2019-05-27

## 2019-05-27 VITALS — HEIGHT: 61 IN | BODY MASS INDEX: 23.41 KG/M2 | WEIGHT: 124 LBS

## 2019-05-27 DIAGNOSIS — I73.9 PAD (PERIPHERAL ARTERY DISEASE) (HCC): ICD-10-CM

## 2019-05-27 LAB
ANION GAP SERPL CALCULATED.3IONS-SCNC: 12 MMOL/L
APTT PPP: 30.9 SECONDS (ref 24–37)
BASOPHILS # BLD AUTO: 0.03 10*3/MM3 (ref 0–0.2)
BASOPHILS NFR BLD AUTO: 0.5 % (ref 0–1.5)
BUN BLD-MCNC: 36 MG/DL (ref 8–23)
BUN/CREAT SERPL: 36.4 (ref 7–25)
CALCIUM SPEC-SCNC: 9.8 MG/DL (ref 8.6–10.5)
CHLORIDE SERPL-SCNC: 88 MMOL/L (ref 98–107)
CO2 SERPL-SCNC: 34 MMOL/L (ref 22–29)
CREAT BLD-MCNC: 0.99 MG/DL (ref 0.57–1)
DEPRECATED RDW RBC AUTO: 50.8 FL (ref 37–54)
EOSINOPHIL # BLD AUTO: 0.04 10*3/MM3 (ref 0–0.4)
EOSINOPHIL NFR BLD AUTO: 0.7 % (ref 0.3–6.2)
ERYTHROCYTE [DISTWIDTH] IN BLOOD BY AUTOMATED COUNT: 14.2 % (ref 12.3–15.4)
GFR SERPL CREATININE-BSD FRML MDRD: 54 ML/MIN/1.73
GLUCOSE BLD-MCNC: 97 MG/DL (ref 65–99)
HBA1C MFR BLD: 5.6 % (ref 4.8–5.6)
HCT VFR BLD AUTO: 44.5 % (ref 34–46.6)
HGB BLD-MCNC: 14.8 G/DL (ref 12–15.9)
IMM GRANULOCYTES # BLD AUTO: 0.01 10*3/MM3 (ref 0–0.05)
IMM GRANULOCYTES NFR BLD AUTO: 0.2 % (ref 0–0.5)
INR PPP: 1.19 (ref 0.85–1.16)
LYMPHOCYTES # BLD AUTO: 1.02 10*3/MM3 (ref 0.7–3.1)
LYMPHOCYTES NFR BLD AUTO: 17 % (ref 19.6–45.3)
MCH RBC QN AUTO: 32.2 PG (ref 26.6–33)
MCHC RBC AUTO-ENTMCNC: 33.3 G/DL (ref 31.5–35.7)
MCV RBC AUTO: 96.9 FL (ref 79–97)
MONOCYTES # BLD AUTO: 0.61 10*3/MM3 (ref 0.1–0.9)
MONOCYTES NFR BLD AUTO: 10.2 % (ref 5–12)
NEUTROPHILS # BLD AUTO: 4.29 10*3/MM3 (ref 1.7–7)
NEUTROPHILS NFR BLD AUTO: 71.6 % (ref 42.7–76)
PLATELET # BLD AUTO: 173 10*3/MM3 (ref 140–450)
PMV BLD AUTO: 9.2 FL (ref 6–12)
POTASSIUM BLD-SCNC: 3.2 MMOL/L (ref 3.5–5.2)
PROTHROMBIN TIME: 14.6 SECONDS (ref 11.2–14.3)
RBC # BLD AUTO: 4.59 10*6/MM3 (ref 3.77–5.28)
SODIUM BLD-SCNC: 134 MMOL/L (ref 136–145)
WBC NRBC COR # BLD: 5.99 10*3/MM3 (ref 3.4–10.8)

## 2019-05-27 PROCEDURE — 85730 THROMBOPLASTIN TIME PARTIAL: CPT | Performed by: PHYSICIAN ASSISTANT

## 2019-05-27 PROCEDURE — 85610 PROTHROMBIN TIME: CPT | Performed by: PHYSICIAN ASSISTANT

## 2019-05-27 PROCEDURE — 36415 COLL VENOUS BLD VENIPUNCTURE: CPT

## 2019-05-27 PROCEDURE — 83036 HEMOGLOBIN GLYCOSYLATED A1C: CPT | Performed by: PHYSICIAN ASSISTANT

## 2019-05-27 PROCEDURE — 85025 COMPLETE CBC W/AUTO DIFF WBC: CPT | Performed by: PHYSICIAN ASSISTANT

## 2019-05-27 PROCEDURE — 80048 BASIC METABOLIC PNL TOTAL CA: CPT | Performed by: PHYSICIAN ASSISTANT

## 2019-05-27 RX ORDER — PHENOL 1.4 %
1200 AEROSOL, SPRAY (ML) MUCOUS MEMBRANE DAILY
COMMUNITY

## 2019-05-28 ENCOUNTER — HOSPITAL ENCOUNTER (OUTPATIENT)
Facility: HOSPITAL | Age: 84
Discharge: HOME | End: 2019-05-28
Attending: THORACIC SURGERY (CARDIOTHORACIC VASCULAR SURGERY) | Admitting: THORACIC SURGERY (CARDIOTHORACIC VASCULAR SURGERY)

## 2019-05-28 ENCOUNTER — ANESTHESIA EVENT (OUTPATIENT)
Dept: PERIOP | Facility: HOSPITAL | Age: 84
End: 2019-05-28

## 2019-05-28 ENCOUNTER — APPOINTMENT (OUTPATIENT)
Dept: GENERAL RADIOLOGY | Facility: HOSPITAL | Age: 84
End: 2019-05-28

## 2019-05-28 ENCOUNTER — ANESTHESIA (OUTPATIENT)
Dept: PERIOP | Facility: HOSPITAL | Age: 84
End: 2019-05-28

## 2019-05-28 ENCOUNTER — PREP FOR SURGERY (OUTPATIENT)
Dept: OTHER | Facility: HOSPITAL | Age: 84
End: 2019-05-28

## 2019-05-28 VITALS
TEMPERATURE: 97.5 F | RESPIRATION RATE: 12 BRPM | BODY MASS INDEX: 23.41 KG/M2 | WEIGHT: 124 LBS | OXYGEN SATURATION: 99 % | HEIGHT: 61 IN | HEART RATE: 45 BPM | DIASTOLIC BLOOD PRESSURE: 49 MMHG | SYSTOLIC BLOOD PRESSURE: 127 MMHG

## 2019-05-28 DIAGNOSIS — I73.9 PERIPHERAL ARTERIAL DISEASE (HCC): Primary | ICD-10-CM

## 2019-05-28 DIAGNOSIS — I73.9 PAD (PERIPHERAL ARTERY DISEASE) (HCC): ICD-10-CM

## 2019-05-28 DIAGNOSIS — I73.9 PAD (PERIPHERAL ARTERY DISEASE) (HCC): Primary | ICD-10-CM

## 2019-05-28 PROCEDURE — 25010000002 FENTANYL CITRATE (PF) 100 MCG/2ML SOLUTION: Performed by: NURSE ANESTHETIST, CERTIFIED REGISTERED

## 2019-05-28 PROCEDURE — C1769 GUIDE WIRE: HCPCS | Performed by: THORACIC SURGERY (CARDIOTHORACIC VASCULAR SURGERY)

## 2019-05-28 PROCEDURE — 0 IODIXANOL PER 1 ML: Performed by: THORACIC SURGERY (CARDIOTHORACIC VASCULAR SURGERY)

## 2019-05-28 PROCEDURE — 75625 CONTRAST EXAM ABDOMINL AORTA: CPT | Performed by: THORACIC SURGERY (CARDIOTHORACIC VASCULAR SURGERY)

## 2019-05-28 PROCEDURE — 75716 ARTERY X-RAYS ARMS/LEGS: CPT | Performed by: THORACIC SURGERY (CARDIOTHORACIC VASCULAR SURGERY)

## 2019-05-28 PROCEDURE — 94799 UNLISTED PULMONARY SVC/PX: CPT

## 2019-05-28 PROCEDURE — 25010000002 HEPARIN (PORCINE) PER 1000 UNITS: Performed by: THORACIC SURGERY (CARDIOTHORACIC VASCULAR SURGERY)

## 2019-05-28 PROCEDURE — C1894 INTRO/SHEATH, NON-LASER: HCPCS | Performed by: THORACIC SURGERY (CARDIOTHORACIC VASCULAR SURGERY)

## 2019-05-28 PROCEDURE — 36200 PLACE CATHETER IN AORTA: CPT | Performed by: THORACIC SURGERY (CARDIOTHORACIC VASCULAR SURGERY)

## 2019-05-28 PROCEDURE — 25010000002 PROPOFOL 10 MG/ML EMULSION: Performed by: NURSE ANESTHETIST, CERTIFIED REGISTERED

## 2019-05-28 PROCEDURE — C1887 CATHETER, GUIDING: HCPCS | Performed by: THORACIC SURGERY (CARDIOTHORACIC VASCULAR SURGERY)

## 2019-05-28 PROCEDURE — 75716 ARTERY X-RAYS ARMS/LEGS: CPT

## 2019-05-28 RX ORDER — HYDROMORPHONE HYDROCHLORIDE 1 MG/ML
0.5 INJECTION, SOLUTION INTRAMUSCULAR; INTRAVENOUS; SUBCUTANEOUS
Status: CANCELLED | OUTPATIENT
Start: 2019-05-28

## 2019-05-28 RX ORDER — SODIUM CHLORIDE, SODIUM LACTATE, POTASSIUM CHLORIDE, CALCIUM CHLORIDE 600; 310; 30; 20 MG/100ML; MG/100ML; MG/100ML; MG/100ML
9 INJECTION, SOLUTION INTRAVENOUS CONTINUOUS PRN
Status: DISCONTINUED | OUTPATIENT
Start: 2019-05-28 | End: 2019-05-28 | Stop reason: HOSPADM

## 2019-05-28 RX ORDER — HYDROMORPHONE HYDROCHLORIDE 1 MG/ML
0.5 INJECTION, SOLUTION INTRAMUSCULAR; INTRAVENOUS; SUBCUTANEOUS
Status: DISCONTINUED | OUTPATIENT
Start: 2019-05-28 | End: 2019-05-28 | Stop reason: HOSPADM

## 2019-05-28 RX ORDER — FENTANYL CITRATE 50 UG/ML
50 INJECTION, SOLUTION INTRAMUSCULAR; INTRAVENOUS
Status: CANCELLED | OUTPATIENT
Start: 2019-05-28

## 2019-05-28 RX ORDER — SODIUM CHLORIDE, SODIUM LACTATE, POTASSIUM CHLORIDE, CALCIUM CHLORIDE 600; 310; 30; 20 MG/100ML; MG/100ML; MG/100ML; MG/100ML
9 INJECTION, SOLUTION INTRAVENOUS CONTINUOUS
Status: DISCONTINUED | OUTPATIENT
Start: 2019-05-28 | End: 2019-05-28 | Stop reason: HOSPADM

## 2019-05-28 RX ORDER — SODIUM CHLORIDE 450 MG/100ML
100 INJECTION, SOLUTION INTRAVENOUS CONTINUOUS
Status: DISCONTINUED | OUTPATIENT
Start: 2019-05-28 | End: 2019-05-28 | Stop reason: HOSPADM

## 2019-05-28 RX ORDER — SODIUM CHLORIDE 0.9 % (FLUSH) 0.9 %
3 SYRINGE (ML) INJECTION EVERY 12 HOURS SCHEDULED
Status: DISCONTINUED | OUTPATIENT
Start: 2019-05-28 | End: 2019-05-28

## 2019-05-28 RX ORDER — LABETALOL HYDROCHLORIDE 5 MG/ML
5 INJECTION, SOLUTION INTRAVENOUS
Status: DISCONTINUED | OUTPATIENT
Start: 2019-05-28 | End: 2019-05-28 | Stop reason: HOSPADM

## 2019-05-28 RX ORDER — GLYCOPYRROLATE 0.2 MG/ML
INJECTION INTRAMUSCULAR; INTRAVENOUS AS NEEDED
Status: DISCONTINUED | OUTPATIENT
Start: 2019-05-28 | End: 2019-05-28 | Stop reason: SURG

## 2019-05-28 RX ORDER — FENTANYL CITRATE 50 UG/ML
50 INJECTION, SOLUTION INTRAMUSCULAR; INTRAVENOUS
Status: DISCONTINUED | OUTPATIENT
Start: 2019-05-28 | End: 2019-05-28 | Stop reason: HOSPADM

## 2019-05-28 RX ORDER — FENTANYL CITRATE 50 UG/ML
INJECTION, SOLUTION INTRAMUSCULAR; INTRAVENOUS AS NEEDED
Status: DISCONTINUED | OUTPATIENT
Start: 2019-05-28 | End: 2019-05-28 | Stop reason: SURG

## 2019-05-28 RX ORDER — IODIXANOL 320 MG/ML
INJECTION, SOLUTION INTRAVASCULAR AS NEEDED
Status: DISCONTINUED | OUTPATIENT
Start: 2019-05-28 | End: 2019-05-28 | Stop reason: HOSPADM

## 2019-05-28 RX ORDER — PROPOFOL 10 MG/ML
VIAL (ML) INTRAVENOUS AS NEEDED
Status: DISCONTINUED | OUTPATIENT
Start: 2019-05-28 | End: 2019-05-28 | Stop reason: SURG

## 2019-05-28 RX ORDER — LIDOCAINE HYDROCHLORIDE 10 MG/ML
0.5 INJECTION, SOLUTION EPIDURAL; INFILTRATION; INTRACAUDAL; PERINEURAL ONCE AS NEEDED
Status: DISCONTINUED | OUTPATIENT
Start: 2019-05-28 | End: 2019-05-28

## 2019-05-28 RX ORDER — FAMOTIDINE 10 MG/ML
20 INJECTION, SOLUTION INTRAVENOUS ONCE
Status: DISCONTINUED | OUTPATIENT
Start: 2019-05-28 | End: 2019-05-28

## 2019-05-28 RX ORDER — FAMOTIDINE 20 MG/1
20 TABLET, FILM COATED ORAL ONCE
Status: DISCONTINUED | OUTPATIENT
Start: 2019-05-28 | End: 2019-05-28

## 2019-05-28 RX ORDER — ONDANSETRON 2 MG/ML
4 INJECTION INTRAMUSCULAR; INTRAVENOUS ONCE AS NEEDED
Status: DISCONTINUED | OUTPATIENT
Start: 2019-05-28 | End: 2019-05-28 | Stop reason: HOSPADM

## 2019-05-28 RX ORDER — LABETALOL HYDROCHLORIDE 5 MG/ML
5 INJECTION, SOLUTION INTRAVENOUS
Status: CANCELLED | OUTPATIENT
Start: 2019-05-28

## 2019-05-28 RX ORDER — LIDOCAINE HYDROCHLORIDE 10 MG/ML
0.5 INJECTION, SOLUTION EPIDURAL; INFILTRATION; INTRACAUDAL; PERINEURAL ONCE AS NEEDED
Status: COMPLETED | OUTPATIENT
Start: 2019-05-28 | End: 2019-05-28

## 2019-05-28 RX ORDER — WARFARIN SODIUM 5 MG/1
2.5 TABLET ORAL NIGHTLY
Start: 2019-05-29 | End: 2020-01-27

## 2019-05-28 RX ORDER — LIDOCAINE HYDROCHLORIDE 10 MG/ML
INJECTION, SOLUTION EPIDURAL; INFILTRATION; INTRACAUDAL; PERINEURAL AS NEEDED
Status: DISCONTINUED | OUTPATIENT
Start: 2019-05-28 | End: 2019-05-28 | Stop reason: SURG

## 2019-05-28 RX ORDER — SODIUM CHLORIDE 0.9 % (FLUSH) 0.9 %
3-10 SYRINGE (ML) INJECTION AS NEEDED
Status: DISCONTINUED | OUTPATIENT
Start: 2019-05-28 | End: 2019-05-28

## 2019-05-28 RX ORDER — LIDOCAINE HYDROCHLORIDE 10 MG/ML
INJECTION, SOLUTION INFILTRATION; PERINEURAL AS NEEDED
Status: DISCONTINUED | OUTPATIENT
Start: 2019-05-28 | End: 2019-05-28 | Stop reason: HOSPADM

## 2019-05-28 RX ORDER — FAMOTIDINE 20 MG/1
20 TABLET, FILM COATED ORAL
Status: COMPLETED | OUTPATIENT
Start: 2019-05-28 | End: 2019-05-28

## 2019-05-28 RX ADMIN — LIDOCAINE HYDROCHLORIDE 50 MG: 10 INJECTION, SOLUTION EPIDURAL; INFILTRATION; INTRACAUDAL; PERINEURAL at 07:11

## 2019-05-28 RX ADMIN — FENTANYL CITRATE 50 MCG: 50 INJECTION, SOLUTION INTRAMUSCULAR; INTRAVENOUS at 08:14

## 2019-05-28 RX ADMIN — FAMOTIDINE 20 MG: 20 TABLET ORAL at 06:34

## 2019-05-28 RX ADMIN — GLYCOPYRROLATE 0.2 MG: 0.2 INJECTION, SOLUTION INTRAMUSCULAR; INTRAVENOUS at 07:15

## 2019-05-28 RX ADMIN — LABETALOL 20 MG/4 ML (5 MG/ML) INTRAVENOUS SYRINGE 5 MG: at 08:02

## 2019-05-28 RX ADMIN — SODIUM CHLORIDE, POTASSIUM CHLORIDE, SODIUM LACTATE AND CALCIUM CHLORIDE 9 ML/HR: 600; 310; 30; 20 INJECTION, SOLUTION INTRAVENOUS at 06:35

## 2019-05-28 RX ADMIN — FENTANYL CITRATE 50 MCG: 50 INJECTION, SOLUTION INTRAMUSCULAR; INTRAVENOUS at 08:04

## 2019-05-28 RX ADMIN — LIDOCAINE HYDROCHLORIDE 0.5 ML: 10 INJECTION, SOLUTION EPIDURAL; INFILTRATION; INTRACAUDAL; PERINEURAL at 06:34

## 2019-05-28 RX ADMIN — SODIUM CHLORIDE 100 ML/HR: 4.5 INJECTION, SOLUTION INTRAVENOUS at 09:24

## 2019-05-28 RX ADMIN — PROPOFOL 50 MG: 10 INJECTION, EMULSION INTRAVENOUS at 07:11

## 2019-05-28 RX ADMIN — FENTANYL CITRATE 50 MCG: 50 INJECTION, SOLUTION INTRAMUSCULAR; INTRAVENOUS at 07:14

## 2019-05-28 RX ADMIN — EPHEDRINE SULFATE 10 MG: 50 INJECTION INTRAMUSCULAR; INTRAVENOUS; SUBCUTANEOUS at 07:22

## 2019-05-28 NOTE — ANESTHESIA POSTPROCEDURE EVALUATION
Patient: Kathleen Allen    Procedure Summary     Date:  05/28/19 Room / Location:   [facility] /  [facility]    Anesthesia Start:  0700 Anesthesia Stop:      Procedure:  ABDOMINAL AORTAGRAM WITH PERIPHERAL RUNOFFS (N/A Abdomen) Diagnosis:       PVD (peripheral vascular disease) (CMS/HCC)      (PVD (peripheral vascular disease) (CMS/HCC) [I73.9])    Surgeon:  Keyur Maki MD Provider:  Sonu Lyon MD    Anesthesia Type:  general ASA Status:  3          Anesthesia Type: general  Last vitals  /62  145/81 (05/28/19 0622)   Temp 98  98 °F (36.7 °C) (05/28/19 0622)   Pulse 62  56 (05/28/19 0622)   Resp 16  18 (05/28/19 0622)     SpO2 100  93 % (05/28/19 0622)     Post Anesthesia Care and Evaluation    Patient location during evaluation: PACU  Patient participation: complete - patient participated  Level of consciousness: awake and alert  Pain score: 0  Pain management: adequate  Airway patency: patent  Anesthetic complications: No anesthetic complications  PONV Status: none  Cardiovascular status: hemodynamically stable and acceptable  Respiratory status: nonlabored ventilation, acceptable, nasal cannula and oral airway  Hydration status: acceptable

## 2019-05-28 NOTE — ANESTHESIA PROCEDURE NOTES
Airway  Urgency: elective    Airway not difficult    General Information and Staff    Patient location during procedure: OR    Indications and Patient Condition  Indications for airway management: airway protection    Preoxygenated: yes  Mask difficulty assessment: 1 - vent by mask    Final Airway Details  Final airway type: supraglottic airway      Successful airway: I-gel  Size 4    Number of attempts at approach: 1    Additional Comments  LMA placed without difficulty, ventilation with assist, equal breath sounds and symmetric chest rise and fall

## 2019-05-28 NOTE — TELEPHONE ENCOUNTER
Second attempt to reach pt. Someone answered then ended the call before I could say why I was calling.       Called again and spoke w/ pt's daughter. The connection was poor and she stated she would call back.

## 2019-05-28 NOTE — ANESTHESIA PREPROCEDURE EVALUATION
Anesthesia Evaluation     Patient summary reviewed and Nursing notes reviewed   NPO Solid Status: > 8 hours  NPO Liquid Status: > 8 hours           Airway   Mallampati: I  TM distance: >3 FB  Neck ROM: full  No difficulty expected  Dental    (+) edentulous    Pulmonary - normal exam   Cardiovascular   Exercise tolerance: poor (<4 METS)    Rhythm: regular  Rate: normal        Neuro/Psych  GI/Hepatic/Renal/Endo      Musculoskeletal     Abdominal    Substance History      OB/GYN          Other                      Anesthesia Plan    ASA 3     general     intravenous induction   Anesthetic plan, all risks, benefits, and alternatives have been provided, discussed and informed consent has been obtained with: patient.

## 2019-05-29 RX ORDER — CHLORHEXIDINE GLUCONATE 500 MG/1
1 CLOTH TOPICAL EVERY 12 HOURS PRN
Status: CANCELLED | OUTPATIENT
Start: 2019-05-29

## 2019-05-29 NOTE — TELEPHONE ENCOUNTER
Informed pt's daughter of results, she verbalized understanding.   She stated that pt had procedure yesterday and that they were unable to do stents due to 100% blockage. States pt has to have another surgery done. I verbalized understanding and stated to call if they need anything.

## 2019-06-03 ENCOUNTER — ANESTHESIA EVENT (OUTPATIENT)
Dept: PERIOP | Facility: HOSPITAL | Age: 84
End: 2019-06-03

## 2019-06-03 ENCOUNTER — APPOINTMENT (OUTPATIENT)
Dept: PREADMISSION TESTING | Facility: HOSPITAL | Age: 84
End: 2019-06-03

## 2019-06-03 ENCOUNTER — HOSPITAL ENCOUNTER (OUTPATIENT)
Dept: GENERAL RADIOLOGY | Facility: HOSPITAL | Age: 84
Discharge: HOME | End: 2019-06-03
Admitting: PHYSICIAN ASSISTANT

## 2019-06-03 VITALS — OXYGEN SATURATION: 94 % | HEIGHT: 61 IN | BODY MASS INDEX: 23.68 KG/M2 | WEIGHT: 125.44 LBS

## 2019-06-03 DIAGNOSIS — I73.9 PAD (PERIPHERAL ARTERY DISEASE) (HCC): ICD-10-CM

## 2019-06-03 LAB
ABO GROUP BLD: NORMAL
ANION GAP SERPL CALCULATED.3IONS-SCNC: 11 MMOL/L
APTT PPP: 26.3 SECONDS (ref 24–37)
BLD GP AB SCN SERPL QL: NEGATIVE
BUN BLD-MCNC: 39 MG/DL (ref 8–23)
BUN/CREAT SERPL: 39.8 (ref 7–25)
CALCIUM SPEC-SCNC: 9.8 MG/DL (ref 8.6–10.5)
CHLORIDE SERPL-SCNC: 91 MMOL/L (ref 98–107)
CO2 SERPL-SCNC: 34 MMOL/L (ref 22–29)
CREAT BLD-MCNC: 0.98 MG/DL (ref 0.57–1)
GFR SERPL CREATININE-BSD FRML MDRD: 54 ML/MIN/1.73
GLUCOSE BLD-MCNC: 102 MG/DL (ref 65–99)
INR PPP: 1.09 (ref 0.85–1.16)
POTASSIUM BLD-SCNC: 3.8 MMOL/L (ref 3.5–5.2)
PROTHROMBIN TIME: 13.6 SECONDS (ref 11.2–14.3)
RH BLD: POSITIVE
SODIUM BLD-SCNC: 136 MMOL/L (ref 136–145)
T&S EXPIRATION DATE: NORMAL

## 2019-06-03 PROCEDURE — 36415 COLL VENOUS BLD VENIPUNCTURE: CPT

## 2019-06-03 PROCEDURE — 85610 PROTHROMBIN TIME: CPT | Performed by: PHYSICIAN ASSISTANT

## 2019-06-03 PROCEDURE — 86901 BLOOD TYPING SEROLOGIC RH(D): CPT | Performed by: PHYSICIAN ASSISTANT

## 2019-06-03 PROCEDURE — 80048 BASIC METABOLIC PNL TOTAL CA: CPT | Performed by: PHYSICIAN ASSISTANT

## 2019-06-03 PROCEDURE — 71046 X-RAY EXAM CHEST 2 VIEWS: CPT

## 2019-06-03 PROCEDURE — 86900 BLOOD TYPING SEROLOGIC ABO: CPT | Performed by: PHYSICIAN ASSISTANT

## 2019-06-03 PROCEDURE — 86850 RBC ANTIBODY SCREEN: CPT | Performed by: PHYSICIAN ASSISTANT

## 2019-06-03 PROCEDURE — 86923 COMPATIBILITY TEST ELECTRIC: CPT

## 2019-06-03 PROCEDURE — 85730 THROMBOPLASTIN TIME PARTIAL: CPT | Performed by: PHYSICIAN ASSISTANT

## 2019-06-03 RX ORDER — FAMOTIDINE 10 MG/ML
20 INJECTION, SOLUTION INTRAVENOUS ONCE
Status: CANCELLED | OUTPATIENT
Start: 2019-06-03 | End: 2019-06-03

## 2019-06-03 NOTE — PAT
Per Anesthesia Request, patient instructed not to take their ACE/ARB medications on the AM of surgery.    BACTROBAN APPLIED TO EACH NOSTRIL DURING PAT VISIT     Blood bank bracelet applied to patient during Pre Admission Testing visit.  Patient instructed not to remove from arm until after procedure and they are discharged from the hospital.  Explained to patient that they may shower and get the bracelet wet, but not to immerse under water for longer periods (bathing, swimming, hand dishwashing, etc).  Patient verbalized understanding.

## 2019-06-04 ENCOUNTER — ANESTHESIA (OUTPATIENT)
Dept: PERIOP | Facility: HOSPITAL | Age: 84
End: 2019-06-04

## 2019-06-04 ENCOUNTER — HOSPITAL ENCOUNTER (INPATIENT)
Facility: HOSPITAL | Age: 84
LOS: 4 days | Discharge: HOME | End: 2019-06-08
Attending: THORACIC SURGERY (CARDIOTHORACIC VASCULAR SURGERY) | Admitting: THORACIC SURGERY (CARDIOTHORACIC VASCULAR SURGERY)

## 2019-06-04 ENCOUNTER — APPOINTMENT (OUTPATIENT)
Dept: GENERAL RADIOLOGY | Facility: HOSPITAL | Age: 84
End: 2019-06-04

## 2019-06-04 DIAGNOSIS — Z74.09 IMPAIRED FUNCTIONAL MOBILITY, BALANCE, GAIT, AND ENDURANCE: Primary | ICD-10-CM

## 2019-06-04 DIAGNOSIS — I73.9 PAD (PERIPHERAL ARTERY DISEASE) (HCC): ICD-10-CM

## 2019-06-04 DIAGNOSIS — I73.9 PERIPHERAL ARTERIAL DISEASE (HCC): ICD-10-CM

## 2019-06-04 PROBLEM — Z87.891 FORMER SMOKER: Status: ACTIVE | Noted: 2019-06-04

## 2019-06-04 PROBLEM — J44.9 COPD (CHRONIC OBSTRUCTIVE PULMONARY DISEASE) (HCC): Status: ACTIVE | Noted: 2019-06-04

## 2019-06-04 PROBLEM — I48.91 A-FIB (HCC): Status: ACTIVE | Noted: 2019-06-04

## 2019-06-04 LAB
ABO GROUP BLD: NORMAL
D-LACTATE SERPL-SCNC: 1.1 MMOL/L (ref 0.5–2)
POTASSIUM BLD-SCNC: 3.4 MMOL/L (ref 3.5–5.2)
RH BLD: POSITIVE

## 2019-06-04 PROCEDURE — 35656 BPG FEMORAL-POPLITEAL: CPT | Performed by: THORACIC SURGERY (CARDIOTHORACIC VASCULAR SURGERY)

## 2019-06-04 PROCEDURE — 94799 UNLISTED PULMONARY SVC/PX: CPT

## 2019-06-04 PROCEDURE — 041L0JJ BYPASS LEFT FEMORAL ARTERY TO LEFT FEMORAL ARTERY WITH SYNTHETIC SUBSTITUTE, OPEN APPROACH: ICD-10-PCS | Performed by: THORACIC SURGERY (CARDIOTHORACIC VASCULAR SURGERY)

## 2019-06-04 PROCEDURE — 0 IODIXANOL PER 1 ML: Performed by: THORACIC SURGERY (CARDIOTHORACIC VASCULAR SURGERY)

## 2019-06-04 PROCEDURE — 25010000003 CEFUROXIME SODIUM 1.5 G RECONSTITUTED SOLUTION: Performed by: PHYSICIAN ASSISTANT

## 2019-06-04 PROCEDURE — 83605 ASSAY OF LACTIC ACID: CPT | Performed by: PHYSICIAN ASSISTANT

## 2019-06-04 PROCEDURE — C1894 INTRO/SHEATH, NON-LASER: HCPCS | Performed by: THORACIC SURGERY (CARDIOTHORACIC VASCULAR SURGERY)

## 2019-06-04 PROCEDURE — 25010000002 HEPARIN (PORCINE) PER 1000 UNITS: Performed by: NURSE ANESTHETIST, CERTIFIED REGISTERED

## 2019-06-04 PROCEDURE — 25010000002 PROTAMINE SULFATE PER 10 MG: Performed by: NURSE ANESTHETIST, CERTIFIED REGISTERED

## 2019-06-04 PROCEDURE — 25010000002 PROPOFOL 1000 MG/ML EMULSION: Performed by: NURSE ANESTHETIST, CERTIFIED REGISTERED

## 2019-06-04 PROCEDURE — 88304 TISSUE EXAM BY PATHOLOGIST: CPT | Performed by: THORACIC SURGERY (CARDIOTHORACIC VASCULAR SURGERY)

## 2019-06-04 PROCEDURE — 25010000002 PHENYLEPHRINE PER 1 ML: Performed by: NURSE ANESTHETIST, CERTIFIED REGISTERED

## 2019-06-04 PROCEDURE — C1768 GRAFT, VASCULAR: HCPCS | Performed by: THORACIC SURGERY (CARDIOTHORACIC VASCULAR SURGERY)

## 2019-06-04 PROCEDURE — 86901 BLOOD TYPING SEROLOGIC RH(D): CPT

## 2019-06-04 PROCEDURE — 25010000002 DEXAMETHASONE PER 1 MG: Performed by: NURSE ANESTHETIST, CERTIFIED REGISTERED

## 2019-06-04 PROCEDURE — 04CN0ZZ EXTIRPATION OF MATTER FROM LEFT POPLITEAL ARTERY, OPEN APPROACH: ICD-10-PCS | Performed by: THORACIC SURGERY (CARDIOTHORACIC VASCULAR SURGERY)

## 2019-06-04 PROCEDURE — 88311 DECALCIFY TISSUE: CPT | Performed by: THORACIC SURGERY (CARDIOTHORACIC VASCULAR SURGERY)

## 2019-06-04 PROCEDURE — 25010000002 PROPOFOL 10 MG/ML EMULSION: Performed by: NURSE ANESTHETIST, CERTIFIED REGISTERED

## 2019-06-04 PROCEDURE — 84132 ASSAY OF SERUM POTASSIUM: CPT | Performed by: THORACIC SURGERY (CARDIOTHORACIC VASCULAR SURGERY)

## 2019-06-04 PROCEDURE — 25010000002 NEOSTIGMINE 10 MG/10ML SOLUTION: Performed by: NURSE ANESTHETIST, CERTIFIED REGISTERED

## 2019-06-04 PROCEDURE — 35661 BPG FEMORAL-FEMORAL: CPT | Performed by: THORACIC SURGERY (CARDIOTHORACIC VASCULAR SURGERY)

## 2019-06-04 PROCEDURE — 25010000002 ONDANSETRON PER 1 MG: Performed by: NURSE ANESTHETIST, CERTIFIED REGISTERED

## 2019-06-04 PROCEDURE — C1769 GUIDE WIRE: HCPCS | Performed by: THORACIC SURGERY (CARDIOTHORACIC VASCULAR SURGERY)

## 2019-06-04 PROCEDURE — 99232 SBSQ HOSP IP/OBS MODERATE 35: CPT | Performed by: INTERNAL MEDICINE

## 2019-06-04 PROCEDURE — 25010000002 IOPAMIDOL 61 % SOLUTION: Performed by: THORACIC SURGERY (CARDIOTHORACIC VASCULAR SURGERY)

## 2019-06-04 PROCEDURE — 86900 BLOOD TYPING SEROLOGIC ABO: CPT

## 2019-06-04 PROCEDURE — 25010000002 FENTANYL CITRATE (PF) 100 MCG/2ML SOLUTION: Performed by: NURSE ANESTHETIST, CERTIFIED REGISTERED

## 2019-06-04 PROCEDURE — 25010000002 HEPARIN (PORCINE) PER 1000 UNITS: Performed by: THORACIC SURGERY (CARDIOTHORACIC VASCULAR SURGERY)

## 2019-06-04 PROCEDURE — C1887 CATHETER, GUIDING: HCPCS | Performed by: THORACIC SURGERY (CARDIOTHORACIC VASCULAR SURGERY)

## 2019-06-04 PROCEDURE — 25010000002 VANCOMYCIN 1 G RECONSTITUTED SOLUTION: Performed by: THORACIC SURGERY (CARDIOTHORACIC VASCULAR SURGERY)

## 2019-06-04 DEVICE — PROPATEN VASCULAR GRAFT TW RR 8MMX40CM 30CM RINGS HEPARIN
Type: IMPLANTABLE DEVICE | Site: ARTERIAL | Status: FUNCTIONAL
Brand: GORE PROPATEN VASCULAR GRAFT

## 2019-06-04 DEVICE — PROPATEN VASCULAR GRAFT TW RR 6MMX50CM 40CM RINGS HEPARIN
Type: IMPLANTABLE DEVICE | Status: FUNCTIONAL
Brand: GORE PROPATEN VASCULAR GRAFT

## 2019-06-04 RX ORDER — SODIUM CHLORIDE 0.9 % (FLUSH) 0.9 %
3-10 SYRINGE (ML) INJECTION AS NEEDED
Status: DISCONTINUED | OUTPATIENT
Start: 2019-06-04 | End: 2019-06-04 | Stop reason: HOSPADM

## 2019-06-04 RX ORDER — SODIUM CHLORIDE 450 MG/100ML
50 INJECTION, SOLUTION INTRAVENOUS CONTINUOUS
Status: DISCONTINUED | OUTPATIENT
Start: 2019-06-04 | End: 2019-06-06

## 2019-06-04 RX ORDER — FENTANYL CITRATE 50 UG/ML
50 INJECTION, SOLUTION INTRAMUSCULAR; INTRAVENOUS
Status: DISCONTINUED | OUTPATIENT
Start: 2019-06-04 | End: 2019-06-04 | Stop reason: HOSPADM

## 2019-06-04 RX ORDER — ATENOLOL 25 MG/1
25 TABLET ORAL EVERY MORNING
Status: DISCONTINUED | OUTPATIENT
Start: 2019-06-05 | End: 2019-06-08 | Stop reason: HOSPADM

## 2019-06-04 RX ORDER — DEXAMETHASONE SODIUM PHOSPHATE 10 MG/ML
INJECTION INTRAMUSCULAR; INTRAVENOUS AS NEEDED
Status: DISCONTINUED | OUTPATIENT
Start: 2019-06-04 | End: 2019-06-04 | Stop reason: SURG

## 2019-06-04 RX ORDER — THROMBIN HUMAN AND FIBRINOGEN 2; 5.5 [USP'U]/1; MG/1
PATCH TOPICAL AS NEEDED
Status: DISCONTINUED | OUTPATIENT
Start: 2019-06-04 | End: 2019-06-04 | Stop reason: HOSPADM

## 2019-06-04 RX ORDER — VANCOMYCIN HYDROCHLORIDE 1 G/20ML
INJECTION, POWDER, LYOPHILIZED, FOR SOLUTION INTRAVENOUS AS NEEDED
Status: DISCONTINUED | OUTPATIENT
Start: 2019-06-04 | End: 2019-06-04 | Stop reason: HOSPADM

## 2019-06-04 RX ORDER — ONDANSETRON 2 MG/ML
4 INJECTION INTRAMUSCULAR; INTRAVENOUS EVERY 6 HOURS PRN
Status: DISCONTINUED | OUTPATIENT
Start: 2019-06-04 | End: 2019-06-07

## 2019-06-04 RX ORDER — HYDROMORPHONE HYDROCHLORIDE 1 MG/ML
0.5 INJECTION, SOLUTION INTRAMUSCULAR; INTRAVENOUS; SUBCUTANEOUS
Status: DISCONTINUED | OUTPATIENT
Start: 2019-06-04 | End: 2019-06-04 | Stop reason: HOSPADM

## 2019-06-04 RX ORDER — MORPHINE SULFATE 4 MG/ML
2 INJECTION, SOLUTION INTRAMUSCULAR; INTRAVENOUS
Status: DISCONTINUED | OUTPATIENT
Start: 2019-06-04 | End: 2019-06-07

## 2019-06-04 RX ORDER — NEOSTIGMINE METHYLSULFATE 1 MG/ML
INJECTION, SOLUTION INTRAVENOUS AS NEEDED
Status: DISCONTINUED | OUTPATIENT
Start: 2019-06-04 | End: 2019-06-04 | Stop reason: SURG

## 2019-06-04 RX ORDER — SODIUM CHLORIDE 0.9 % (FLUSH) 0.9 %
3 SYRINGE (ML) INJECTION EVERY 12 HOURS SCHEDULED
Status: DISCONTINUED | OUTPATIENT
Start: 2019-06-04 | End: 2019-06-04 | Stop reason: HOSPADM

## 2019-06-04 RX ORDER — LIDOCAINE HYDROCHLORIDE 10 MG/ML
0.5 INJECTION, SOLUTION EPIDURAL; INFILTRATION; INTRACAUDAL; PERINEURAL ONCE AS NEEDED
Status: COMPLETED | OUTPATIENT
Start: 2019-06-04 | End: 2019-06-04

## 2019-06-04 RX ORDER — TORSEMIDE 20 MG/1
20 TABLET ORAL 3 TIMES WEEKLY
Status: DISCONTINUED | OUTPATIENT
Start: 2019-06-05 | End: 2019-06-06

## 2019-06-04 RX ORDER — SENNA AND DOCUSATE SODIUM 50; 8.6 MG/1; MG/1
2 TABLET, FILM COATED ORAL 2 TIMES DAILY PRN
Status: DISCONTINUED | OUTPATIENT
Start: 2019-06-04 | End: 2019-06-08 | Stop reason: HOSPADM

## 2019-06-04 RX ORDER — IODIXANOL 320 MG/ML
INJECTION, SOLUTION INTRAVASCULAR AS NEEDED
Status: DISCONTINUED | OUTPATIENT
Start: 2019-06-04 | End: 2019-06-04 | Stop reason: HOSPADM

## 2019-06-04 RX ORDER — ONDANSETRON 4 MG/1
4 TABLET, FILM COATED ORAL EVERY 6 HOURS PRN
Status: DISCONTINUED | OUTPATIENT
Start: 2019-06-04 | End: 2019-06-08 | Stop reason: HOSPADM

## 2019-06-04 RX ORDER — LIDOCAINE HYDROCHLORIDE 20 MG/ML
INJECTION, SOLUTION INFILTRATION; PERINEURAL AS NEEDED
Status: DISCONTINUED | OUTPATIENT
Start: 2019-06-04 | End: 2019-06-04 | Stop reason: SURG

## 2019-06-04 RX ORDER — PROTAMINE SULFATE 10 MG/ML
INJECTION, SOLUTION INTRAVENOUS AS NEEDED
Status: DISCONTINUED | OUTPATIENT
Start: 2019-06-04 | End: 2019-06-04 | Stop reason: SURG

## 2019-06-04 RX ORDER — ONDANSETRON 2 MG/ML
4 INJECTION INTRAMUSCULAR; INTRAVENOUS ONCE AS NEEDED
Status: DISCONTINUED | OUTPATIENT
Start: 2019-06-04 | End: 2019-06-04 | Stop reason: HOSPADM

## 2019-06-04 RX ORDER — SODIUM CHLORIDE, SODIUM LACTATE, POTASSIUM CHLORIDE, CALCIUM CHLORIDE 600; 310; 30; 20 MG/100ML; MG/100ML; MG/100ML; MG/100ML
9 INJECTION, SOLUTION INTRAVENOUS CONTINUOUS
Status: DISCONTINUED | OUTPATIENT
Start: 2019-06-04 | End: 2019-06-05

## 2019-06-04 RX ORDER — PROPOFOL 10 MG/ML
VIAL (ML) INTRAVENOUS AS NEEDED
Status: DISCONTINUED | OUTPATIENT
Start: 2019-06-04 | End: 2019-06-04 | Stop reason: SURG

## 2019-06-04 RX ORDER — GLYCOPYRROLATE 0.2 MG/ML
INJECTION INTRAMUSCULAR; INTRAVENOUS AS NEEDED
Status: DISCONTINUED | OUTPATIENT
Start: 2019-06-04 | End: 2019-06-04 | Stop reason: SURG

## 2019-06-04 RX ORDER — BUPIVACAINE HYDROCHLORIDE AND EPINEPHRINE 5; 5 MG/ML; UG/ML
INJECTION, SOLUTION PERINEURAL AS NEEDED
Status: DISCONTINUED | OUTPATIENT
Start: 2019-06-04 | End: 2019-06-04 | Stop reason: HOSPADM

## 2019-06-04 RX ORDER — ONDANSETRON 2 MG/ML
INJECTION INTRAMUSCULAR; INTRAVENOUS AS NEEDED
Status: DISCONTINUED | OUTPATIENT
Start: 2019-06-04 | End: 2019-06-04 | Stop reason: SURG

## 2019-06-04 RX ORDER — FAMOTIDINE 20 MG/1
20 TABLET, FILM COATED ORAL ONCE
Status: COMPLETED | OUTPATIENT
Start: 2019-06-04 | End: 2019-06-04

## 2019-06-04 RX ORDER — CHLORHEXIDINE GLUCONATE 500 MG/1
1 CLOTH TOPICAL EVERY 12 HOURS PRN
Status: DISCONTINUED | OUTPATIENT
Start: 2019-06-04 | End: 2019-06-04 | Stop reason: HOSPADM

## 2019-06-04 RX ORDER — POTASSIUM CHLORIDE 750 MG/1
20 CAPSULE, EXTENDED RELEASE ORAL DAILY
Status: DISCONTINUED | OUTPATIENT
Start: 2019-06-05 | End: 2019-06-08 | Stop reason: HOSPADM

## 2019-06-04 RX ORDER — DEXAMETHASONE SODIUM PHOSPHATE 4 MG/ML
8 INJECTION, SOLUTION INTRA-ARTICULAR; INTRALESIONAL; INTRAMUSCULAR; INTRAVENOUS; SOFT TISSUE ONCE AS NEEDED
Status: DISCONTINUED | OUTPATIENT
Start: 2019-06-04 | End: 2019-06-04 | Stop reason: HOSPADM

## 2019-06-04 RX ORDER — CLOPIDOGREL BISULFATE 75 MG/1
75 TABLET ORAL DAILY
Status: DISCONTINUED | OUTPATIENT
Start: 2019-06-05 | End: 2019-06-08 | Stop reason: HOSPADM

## 2019-06-04 RX ORDER — ATORVASTATIN CALCIUM 20 MG/1
20 TABLET, FILM COATED ORAL NIGHTLY
Status: DISCONTINUED | OUTPATIENT
Start: 2019-06-04 | End: 2019-06-08 | Stop reason: HOSPADM

## 2019-06-04 RX ORDER — ATRACURIUM BESYLATE 10 MG/ML
INJECTION, SOLUTION INTRAVENOUS AS NEEDED
Status: DISCONTINUED | OUTPATIENT
Start: 2019-06-04 | End: 2019-06-04 | Stop reason: SURG

## 2019-06-04 RX ORDER — ACETAMINOPHEN 325 MG/1
325 TABLET ORAL EVERY 6 HOURS PRN
Status: DISCONTINUED | OUTPATIENT
Start: 2019-06-04 | End: 2019-06-08 | Stop reason: HOSPADM

## 2019-06-04 RX ORDER — HYDROCODONE BITARTRATE AND ACETAMINOPHEN 5; 325 MG/1; MG/1
1 TABLET ORAL EVERY 4 HOURS PRN
Status: DISCONTINUED | OUTPATIENT
Start: 2019-06-04 | End: 2019-06-08 | Stop reason: HOSPADM

## 2019-06-04 RX ORDER — ASPIRIN 81 MG/1
81 TABLET, CHEWABLE ORAL EVERY MORNING
Status: DISCONTINUED | OUTPATIENT
Start: 2019-06-05 | End: 2019-06-08 | Stop reason: HOSPADM

## 2019-06-04 RX ORDER — HEPARIN SODIUM 1000 [USP'U]/ML
INJECTION, SOLUTION INTRAVENOUS; SUBCUTANEOUS AS NEEDED
Status: DISCONTINUED | OUTPATIENT
Start: 2019-06-04 | End: 2019-06-04 | Stop reason: SURG

## 2019-06-04 RX ADMIN — GLYCOPYRROLATE 0.4 MG: 0.2 INJECTION, SOLUTION INTRAMUSCULAR; INTRAVENOUS at 13:45

## 2019-06-04 RX ADMIN — FAMOTIDINE 20 MG: 20 TABLET, FILM COATED ORAL at 08:07

## 2019-06-04 RX ADMIN — PROPOFOL 100 MG: 10 INJECTION, EMULSION INTRAVENOUS at 11:25

## 2019-06-04 RX ADMIN — ATRACURIUM BESYLATE 25 MG: 10 INJECTION, SOLUTION INTRAVENOUS at 11:25

## 2019-06-04 RX ADMIN — SODIUM CHLORIDE, POTASSIUM CHLORIDE, SODIUM LACTATE AND CALCIUM CHLORIDE 9 ML/HR: 600; 310; 30; 20 INJECTION, SOLUTION INTRAVENOUS at 08:00

## 2019-06-04 RX ADMIN — LIDOCAINE HYDROCHLORIDE 25 MG: 20 INJECTION, SOLUTION INFILTRATION; PERINEURAL at 11:25

## 2019-06-04 RX ADMIN — LIDOCAINE HYDROCHLORIDE 50 MG: 20 INJECTION, SOLUTION INFILTRATION; PERINEURAL at 14:09

## 2019-06-04 RX ADMIN — PROPOFOL 20 MCG/KG/MIN: 10 INJECTION, EMULSION INTRAVENOUS at 12:00

## 2019-06-04 RX ADMIN — NEOSTIGMINE METHYLSULFATE 2 MG: 1 INJECTION, SOLUTION INTRAVENOUS at 13:45

## 2019-06-04 RX ADMIN — LIDOCAINE HYDROCHLORIDE 0.5 ML: 10 INJECTION, SOLUTION EPIDURAL; INFILTRATION; INTRACAUDAL; PERINEURAL at 08:00

## 2019-06-04 RX ADMIN — ATORVASTATIN CALCIUM 20 MG: 20 TABLET, FILM COATED ORAL at 20:04

## 2019-06-04 RX ADMIN — ATRACURIUM BESYLATE 10 MG: 10 INJECTION, SOLUTION INTRAVENOUS at 11:43

## 2019-06-04 RX ADMIN — SODIUM CHLORIDE 1.5 G: 900 INJECTION INTRAVENOUS at 16:32

## 2019-06-04 RX ADMIN — ATRACURIUM BESYLATE 5 MG: 10 INJECTION, SOLUTION INTRAVENOUS at 12:44

## 2019-06-04 RX ADMIN — HYDROCHLOROTHIAZIDE: 25 TABLET ORAL at 16:32

## 2019-06-04 RX ADMIN — FENTANYL CITRATE 50 MCG: 50 INJECTION, SOLUTION INTRAMUSCULAR; INTRAVENOUS at 14:27

## 2019-06-04 RX ADMIN — HEPARIN SODIUM 5000 UNITS: 1000 INJECTION, SOLUTION INTRAVENOUS; SUBCUTANEOUS at 12:43

## 2019-06-04 RX ADMIN — DEXAMETHASONE SODIUM PHOSPHATE 4 MG: 10 INJECTION INTRAMUSCULAR; INTRAVENOUS at 11:37

## 2019-06-04 RX ADMIN — EPHEDRINE SULFATE 10 MG: 50 INJECTION INTRAMUSCULAR; INTRAVENOUS; SUBCUTANEOUS at 11:36

## 2019-06-04 RX ADMIN — ONDANSETRON 4 MG: 2 INJECTION INTRAMUSCULAR; INTRAVENOUS at 12:40

## 2019-06-04 RX ADMIN — NICARDIPINE HYDROCHLORIDE 5 MG/HR: 0.1 INJECTION, SOLUTION INTRAVENOUS at 14:30

## 2019-06-04 RX ADMIN — FENTANYL CITRATE 50 MCG: 50 INJECTION, SOLUTION INTRAMUSCULAR; INTRAVENOUS at 14:46

## 2019-06-04 RX ADMIN — HYDROCODONE BITARTRATE AND ACETAMINOPHEN 1 TABLET: 5; 325 TABLET ORAL at 21:32

## 2019-06-04 RX ADMIN — PROTAMINE SULFATE 50 MG: 10 INJECTION, SOLUTION INTRAVENOUS at 13:14

## 2019-06-04 RX ADMIN — ATRACURIUM BESYLATE 10 MG: 10 INJECTION, SOLUTION INTRAVENOUS at 12:15

## 2019-06-04 RX ADMIN — HEPARIN SODIUM 5000 UNITS: 1000 INJECTION, SOLUTION INTRAVENOUS; SUBCUTANEOUS at 12:09

## 2019-06-04 RX ADMIN — SODIUM CHLORIDE 50 ML/HR: 4.5 INJECTION, SOLUTION INTRAVENOUS at 15:23

## 2019-06-04 RX ADMIN — HYDROCODONE BITARTRATE AND ACETAMINOPHEN 1 TABLET: 5; 325 TABLET ORAL at 15:55

## 2019-06-04 RX ADMIN — PHENYLEPHRINE HYDROCHLORIDE 80 MCG: 10 INJECTION INTRAVENOUS at 11:50

## 2019-06-04 RX ADMIN — EPHEDRINE SULFATE 10 MG: 50 INJECTION INTRAMUSCULAR; INTRAVENOUS; SUBCUTANEOUS at 11:50

## 2019-06-04 RX ADMIN — SODIUM CHLORIDE, PRESERVATIVE FREE 3 ML: 5 INJECTION INTRAVENOUS at 15:48

## 2019-06-04 NOTE — PROGRESS NOTES
Intensive Care Follow-up      LOS: 0 days     Ms. Kathleen Allen, 83 y.o. female is followed for:  Glycemic, Electrolyte, Respiratory, and Medical management       Subjective - Interval History     Kathleen Allen is an 83 year old female former smoker with PMH significant for PAD of both lower extremities with associated claudication, especially in the left leg. The pain had been worsening over the past three months and CTA showed left superficial femoral artery occlusion and significant stenosis of the right external iliac artery.  Today she underwent femoral popliteal bypass, femoral femoral bypass per Dr. Maki and is in the ICU post-operatively. She is alert and denies pain, numbness, tingling.     Patient has a history of tobacco abuse but stopped smoking about 10 years ago.  Does not have home oxygen    The patient's relevant past medical, surgical and social history were reviewed and updated in Epic as appropriate.     Objective     Infusions:    lactated ringers 9 mL/hr Last Rate: 9 mL/hr (06/04/19 0800)   niCARdipine 5-15 mg/hr Last Rate: 2.5 mg/hr (06/04/19 1533)   sodium chloride 50 mL/hr Last Rate: 50 mL/hr (06/04/19 1523)     Medications:    [START ON 6/5/2019] aspirin 81 mg Oral QAM   [START ON 6/5/2019] atenolol 25 mg Oral QAM   atorvastatin 20 mg Oral Nightly   cefuroxime 1.5 g Intravenous Q8H   [START ON 6/5/2019] clopidogrel 75 mg Oral Daily   [START ON 6/5/2019] levothyroxine 125 mcg Oral QAM   losartan-HCTZ (HYZAAR) 100-25 combo dose  Oral Daily   [START ON 6/5/2019] potassium chloride 20 mEq Oral Daily   [START ON 6/5/2019] torsemide 20 mg Oral Once per day on Mon Wed Fri     Intake/Output       06/04/19 0700 - 06/05/19 0659    Intake (ml) 600    Output (ml) 575    Net (ml) 25        Vital Sign Min/Max for last 24 hours  Temp  Min: 97.5 °F (36.4 °C)  Max: 98.9 °F (37.2 °C)   BP  Min: 104/48  Max: 156/60   Pulse  Min: 46  Max: 62   Resp  Min: 16  Max: 16   SpO2  Min: 95 %  Max: 100 %    Flow (L/min)  Min: 2  Max: 4        Physical Exam:   GENERAL: Awake, no distress   HEENT: No adenopathy or thyromegaly   LUNGS: Decreased breath sounds bilaterally without wheezes or rhonchi   HEART: Regular rate and rhythm without murmurs   GI: Soft, nontender   EXTREMITIES: Groin incision sites mildly tender but no hematoma or bleeding.  Dopplerable or palpable lower extremity pulses   NEURO/PSYCH: Awake and alert.  Follows commands.  No gross neurologic deficit      Results from last 7 days   Lab Units 06/04/19  0828 06/03/19  1227   SODIUM mmol/L  --  136   POTASSIUM mmol/L 3.4* 3.8   CO2 mmol/L  --  34.0*   BUN mg/dL  --  39*   CREATININE mg/dL  --  0.98   GLUCOSE mg/dL  --  102*     Estimated Creatinine Clearance: 39.8 mL/min (by C-G formula based on SCr of 0.98 mg/dL).              No results found for: LACTATE       Images: AP and lateral chest x-ray preoperatively revealed no evidence of active disease      I reviewed the patient's results and images.     Impression      Active Hospital Problems    Diagnosis   • **Peripheral arterial disease (CMS/HCC)     · Femoral to femoral bypass with 8 mm PTFE and extended endarterectomy of the left profundus artery to provide blood flow to the left thigh  · Left femoral to left popliteal bypass with 6 mm PTFE graft.         • Former smoker   • COPD (chronic obstructive pulmonary disease) (CMS/HCC)        • A-fib (CMS/HCC)     on coumadin./ sees Dr. Bailey               Plan        Monitor in ICU  Neurovascular checks  Incentive spirometry q2h while awake  Pain control  GI/DVT prophylaxis     I discussed the patient's findings and my recommendations with patient and nursing staff      .     IVONNE Guevara, ACNP-BC  Pulmonary & Critical Care    JUAN Martinez MD  Pulmonary and Critical Care Medicine

## 2019-06-04 NOTE — OP NOTE
Operative Report    Preop Diagnosis: Severe peripheral arterial vascular disease with rest pain.            Procedure: Femoral to femoral bypass with 8 mm PTFE and extended endarterectomy of the left profundus artery to provide blood flow to the left thigh.  Left femoral to left popliteal bypass with 6 mm PTFE graft.        Surgeons: Keyur Maki MD surgeon.  Autumn Ivy PA-C Assistant        Indication: This patient has significant claudication and in the last 3 months according to her and her family has developed rest pain that wakes her for 5 times per night primarily in the left leg.  She had had an aortogram last week and unfortunately the equipment malfunctioned during her procedure and images were not saved permanently.  The further cases of the day were canceled secondary to the equipment failure but the patient understood our planned procedure with a femoral to femoral bypass.  We had hoped that this would improve blood flow adequately but also it indicated we may also proceed with a left femoral to popliteal bypass to further improve blood flow.  In the preoperative area the patient stated that her left leg pain was excruciating and we felt almost certainly we would proceed with a left femoral to popliteal bypass also once again she was apprised of the risk of bleeding infection graft failure infection and limb loss and death.  No guarantees were made as to outcome.  She understood she had significantly diseased arteries        Description: Position sterile prep and drape.  Femoral arteries are identified by cutdown and they were rockhard calcific tubes bilaterally.  I was able to endarterectomized both common femoral arteries but the most important factor was the left profundus femoral artery was extensively calcified endarterectomized this specifically to provide adequate blood flow to the thigh on the left.  The specimen was sent to pathology.  An 8 mm PTFE graft was tunneled subcutaneously and  sutured to the right common femoral and left common femoral hooded over the profundus artery.  Good hemostasis was obtained the following this we felt we still do not have adequate blood flow to the left foot I then performed a graft from the left femoral to left above-knee popliteal with a 6 mm PTFE graft.  In the left groin it was a graft to graft anastomoses.  Blood loss was approximately 200 mL good hemostasis was obtained and the incisions were irrigated with an antibiotic solution.  There was a focal narrowing in the distal right superficial femoral artery that we felt we could address at another time.  As we felt that was not a critical part of the patient's problem.  We did obtain Doppler signals in the posterior tibial and dorsalis pedis postoperatively.  At this patient's arteries are extremely diseased I even had to perform a degree of endarterectomy on the popliteal on the left to allow the anastomoses to be performed.  I believe her long-term prognosis is not good ,, also I believe there is very little else we could do in the future to help revascularize the left leg.      Please note that portions of this note were completed with a voice recognition program. Efforts were made to edit the dictations, but occasionally words are mistranscribed.

## 2019-06-04 NOTE — ANESTHESIA PREPROCEDURE EVALUATION
Anesthesia Evaluation     history of anesthetic complications: PONV               Airway   Mallampati: I  TM distance: >3 FB  Neck ROM: full  No difficulty expected  Dental - normal exam   (+) lower dentures and upper dentures    Pulmonary - normal exam   (+) a smoker Former, COPD, shortness of breath,   Cardiovascular - normal exam    (+) hypertension, dysrhythmias Atrial Fib, PVD,     ROS comment: ECHO 5/6/19  1.  Normal LV size, function, and wall motion.  Mild concentric left ventricular hypertrophy.  Grade 1a--2 diastolic dysfunction.  Dilated right ventricle with preserved systolic function.  Moderate left atrial and moderate to severe right atrial enlargement.     2.  Valves are grossly morphologically normal.  There is minimal thickening of the mitral leaflets with no mitral stenosis and mild mitral regurgitation.  There is moderate to severe tricuspid regurgitation.     3.  No pericardial or great vessel pathology.     4.  Pulmonary artery systolic pressures are estimated at 60 mmHg.    Neuro/Psych  GI/Hepatic/Renal/Endo    (+)   hypothyroidism,     Musculoskeletal     Abdominal  - normal exam    Bowel sounds: normal.   Substance History      OB/GYN          Other   (+) arthritis   history of cancer (LYMPH NODE)                  Anesthesia Plan    ASA 3     general     intravenous induction   Anesthetic plan, all risks, benefits, and alternatives have been provided, discussed and informed consent has been obtained with: patient.    Plan discussed with CRNA.

## 2019-06-04 NOTE — ANESTHESIA PROCEDURE NOTES
Airway  Urgency: elective    Airway not difficult    General Information and Staff    Patient location during procedure: OR    Indications and Patient Condition  Indications for airway management: airway protection    Preoxygenated: yes  Mask difficulty assessment: 1 - vent by mask    Final Airway Details  Final airway type: endotracheal airway      Successful airway: ETT  Cuffed: yes   Successful intubation technique: direct laryngoscopy  Facilitating devices/methods: intubating stylet  Endotracheal tube insertion site: oral  Blade: [place]ledge  Blade size: 2  ETT size (mm): 6.5  Cormack-Lehane Classification: grade I - full view of glottis  Placement verified by: chest auscultation and capnometry   Measured from: lips  ETT to lips (cm): 20  Number of attempts at approach: 1

## 2019-06-04 NOTE — ANESTHESIA POSTPROCEDURE EVALUATION
Patient: Kathleen Allen    Procedure Summary     Date:  06/04/19 Room / Location:   [facility] /  [facility]    Anesthesia Start:  1120 Anesthesia Stop:      Procedures:       FEMORAL POPLITEAL BYPASS, FEMORAL FEMORAL BYPASS (Left Thigh)      ANgiogram, fem fem bypass, left fem pop, endarectomy of left popiteal (N/A Thigh) Diagnosis:       Peripheral arterial disease (CMS/HCC)      (Peripheral arterial disease (CMS/HCC) [I73.9])    Surgeon:  Keyur Maki MD Provider:  Bernard Real MD    Anesthesia Type:  general ASA Status:  3          Anesthesia Type: general  Last vitals  BP   156/60 (06/04/19 1423)   Temp   98.9 °F (37.2 °C) (06/04/19 1423)   Pulse   56 (06/04/19 1423)   Resp   16 (06/04/19 1418)     SpO2   100 % (06/04/19 1423)     Post Anesthesia Care and Evaluation    Patient location during evaluation: PACU  Patient participation: waiting for patient participation  Level of consciousness: awake and alert and responsive to physical stimuli  Pain score: 0  Pain management: adequate  Airway patency: patent  Anesthetic complications: No anesthetic complications  PONV Status: none  Cardiovascular status: hemodynamically stable and acceptable  Respiratory status: nonlabored ventilation, acceptable and nasal cannula  Hydration status: acceptable

## 2019-06-04 NOTE — INTERVAL H&P NOTE
[facility] Pre-op    Full history and physical note from office is up to date.  See office note attached.    CV:  S/P AA with runoff 5/28/19 with findings of :  A hand-held injection demonstrated oclusion of the right common iliac artery.  We then cannulated the left femoral artery and a catheter was placed in the suprarenal position aortogram demonstrated the aorta had no significant disease the right common iliac was occluded.  The left common appeared to have a calcific plaque with 70% stenosis however at the external iliac arteries are open.  The catheter was then repositioned below the renal arteries and another run demonstrated the right superficial femoral artery had a focal 95% stenosis in the the distal third.  The left superficial femoral artery was flush occluded at its origin and reconstituted above the knee.      /99 (BP Location: Right arm, Patient Position: Lying)   Pulse 62   Temp 97.9 °F (36.6 °C) (Temporal)   Resp 16   SpO2 95%     CV:  S1S2 reg no murmur  Resp:  Coarse with scattered rhonchi with scant bibasilar crackles    LAB Results:  Lab Results   Component Value Date    WBC 5.99 05/27/2019    HGB 14.8 05/27/2019    HCT 44.5 05/27/2019    MCV 96.9 05/27/2019     05/27/2019    NEUTROABS 4.29 05/27/2019    GLUCOSE 102 (H) 06/03/2019    BUN 39 (H) 06/03/2019    CREATININE 0.98 06/03/2019    EGFRIFNONA 54 (L) 06/03/2019     06/03/2019    K 3.8 06/03/2019    CL 91 (L) 06/03/2019    CO2 34.0 (H) 06/03/2019    CALCIUM 9.8 06/03/2019       Cancer Staging (if applicable)  Cancer Patient: __ yes _x_no __unknown__N/A; If yes, clinical stage T:__ N:__M:__, stage group or __N/A    A/P:  PVD:  Left to right femoral to femoral bypass with a left common iliac stent, stenting of the right distal third of the SFA.  There may be some narrowing at the origin of the right SFA that would require endarterectomy.  Because the patient's pain is primarily in the left calf we might  have to proceed with a left femoropopliteal bypass concurrently.  Discussed with the patient and she is agreeable to proceed.  As above.  Patient was seen again in the preoperative area today and once again has been described of the procedure with the planned 3 incisions in the right and left groin and above the left knee.  She is aware of the risk of bleeding infection graft failure infection and limb loss she agrees to proceed and has been described the risks on at least 2 separate occasions    IVONNE Castillo 6/4/2019 8:40 AM

## 2019-06-04 NOTE — PLAN OF CARE
Problem: Patient Care Overview  Goal: Plan of Care Review   06/04/19 5111   OTHER   Outcome Summary Cardene dc'd after oral b/p meds given VSS. Pulses present by doppler in both lower extremities. Person cath to be d/c in am. Norco given for pain with relief acheived. Pt on room air and sats in the 90's.    Coping/Psychosocial   Plan of Care Reviewed With patient;daughter       Problem: Fall Risk (Adult)  Goal: Identify Related Risk Factors and Signs and Symptoms  Outcome: Ongoing (interventions implemented as appropriate)      Problem: Skin Injury Risk (Adult)  Goal: Skin Health and Integrity  Outcome: Ongoing (interventions implemented as appropriate)      Problem: Tissue Perfusion, Ineffective Peripheral (Adult)  Goal: Adequate Tissue Perfusion  Outcome: Ongoing (interventions implemented as appropriate)

## 2019-06-05 LAB
ANION GAP SERPL CALCULATED.3IONS-SCNC: 11 MMOL/L
BASOPHILS # BLD AUTO: 0.02 10*3/MM3 (ref 0–0.2)
BASOPHILS NFR BLD AUTO: 0.3 % (ref 0–1.5)
BUN BLD-MCNC: 31 MG/DL (ref 8–23)
BUN/CREAT SERPL: 36.9 (ref 7–25)
CALCIUM SPEC-SCNC: 8.8 MG/DL (ref 8.6–10.5)
CHLORIDE SERPL-SCNC: 93 MMOL/L (ref 98–107)
CO2 SERPL-SCNC: 26 MMOL/L (ref 22–29)
CREAT BLD-MCNC: 0.84 MG/DL (ref 0.57–1)
D-LACTATE SERPL-SCNC: 1.3 MMOL/L (ref 0.5–2)
DEPRECATED RDW RBC AUTO: 56.1 FL (ref 37–54)
EOSINOPHIL # BLD AUTO: 0 10*3/MM3 (ref 0–0.4)
EOSINOPHIL NFR BLD AUTO: 0 % (ref 0.3–6.2)
ERYTHROCYTE [DISTWIDTH] IN BLOOD BY AUTOMATED COUNT: 15.2 % (ref 12.3–15.4)
GFR SERPL CREATININE-BSD FRML MDRD: 65 ML/MIN/1.73
GLUCOSE BLD-MCNC: 123 MG/DL (ref 65–99)
HCT VFR BLD AUTO: 35.8 % (ref 34–46.6)
HGB BLD-MCNC: 11.5 G/DL (ref 12–15.9)
IMM GRANULOCYTES # BLD AUTO: 0.01 10*3/MM3 (ref 0–0.05)
IMM GRANULOCYTES NFR BLD AUTO: 0.1 % (ref 0–0.5)
LYMPHOCYTES # BLD AUTO: 0.57 10*3/MM3 (ref 0.7–3.1)
LYMPHOCYTES NFR BLD AUTO: 8.5 % (ref 19.6–45.3)
MCH RBC QN AUTO: 32.2 PG (ref 26.6–33)
MCHC RBC AUTO-ENTMCNC: 32.1 G/DL (ref 31.5–35.7)
MCV RBC AUTO: 100.3 FL (ref 79–97)
MONOCYTES # BLD AUTO: 0.72 10*3/MM3 (ref 0.1–0.9)
MONOCYTES NFR BLD AUTO: 10.8 % (ref 5–12)
NEUTROPHILS # BLD AUTO: 5.37 10*3/MM3 (ref 1.7–7)
NEUTROPHILS NFR BLD AUTO: 80.4 % (ref 42.7–76)
PLATELET # BLD AUTO: 203 10*3/MM3 (ref 140–450)
PMV BLD AUTO: 9.6 FL (ref 6–12)
POTASSIUM BLD-SCNC: 4.1 MMOL/L (ref 3.5–5.2)
RBC # BLD AUTO: 3.57 10*6/MM3 (ref 3.77–5.28)
SODIUM BLD-SCNC: 130 MMOL/L (ref 136–145)
WBC NRBC COR # BLD: 6.68 10*3/MM3 (ref 3.4–10.8)

## 2019-06-05 PROCEDURE — 85025 COMPLETE CBC W/AUTO DIFF WBC: CPT | Performed by: PHYSICIAN ASSISTANT

## 2019-06-05 PROCEDURE — 25010000003 CEFUROXIME SODIUM 1.5 G RECONSTITUTED SOLUTION: Performed by: PHYSICIAN ASSISTANT

## 2019-06-05 PROCEDURE — 99024 POSTOP FOLLOW-UP VISIT: CPT | Performed by: THORACIC SURGERY (CARDIOTHORACIC VASCULAR SURGERY)

## 2019-06-05 PROCEDURE — 83605 ASSAY OF LACTIC ACID: CPT | Performed by: PHYSICIAN ASSISTANT

## 2019-06-05 PROCEDURE — 99232 SBSQ HOSP IP/OBS MODERATE 35: CPT | Performed by: INTERNAL MEDICINE

## 2019-06-05 PROCEDURE — 80048 BASIC METABOLIC PNL TOTAL CA: CPT | Performed by: PHYSICIAN ASSISTANT

## 2019-06-05 RX ADMIN — SODIUM CHLORIDE 50 ML/HR: 4.5 INJECTION, SOLUTION INTRAVENOUS at 15:11

## 2019-06-05 RX ADMIN — SODIUM CHLORIDE 1.5 G: 900 INJECTION INTRAVENOUS at 00:21

## 2019-06-05 RX ADMIN — ASPIRIN 81 MG CHEWABLE TABLET 81 MG: 81 TABLET CHEWABLE at 05:44

## 2019-06-05 RX ADMIN — CLOPIDOGREL BISULFATE 75 MG: 75 TABLET ORAL at 09:21

## 2019-06-05 RX ADMIN — ATORVASTATIN CALCIUM 20 MG: 20 TABLET, FILM COATED ORAL at 20:33

## 2019-06-05 RX ADMIN — SODIUM CHLORIDE 1.5 G: 900 INJECTION INTRAVENOUS at 09:25

## 2019-06-05 RX ADMIN — POTASSIUM CHLORIDE 20 MEQ: 750 CAPSULE, EXTENDED RELEASE ORAL at 16:38

## 2019-06-05 RX ADMIN — TORSEMIDE 20 MG: 20 TABLET ORAL at 16:38

## 2019-06-05 RX ADMIN — ATENOLOL 25 MG: 25 TABLET ORAL at 14:45

## 2019-06-05 RX ADMIN — HYDROCHLOROTHIAZIDE: 25 TABLET ORAL at 16:37

## 2019-06-05 RX ADMIN — HYDROCODONE BITARTRATE AND ACETAMINOPHEN 1 TABLET: 5; 325 TABLET ORAL at 13:17

## 2019-06-05 RX ADMIN — LEVOTHYROXINE SODIUM 125 MCG: 25 TABLET ORAL at 05:44

## 2019-06-05 NOTE — PROGRESS NOTES
CTS Progress Note       LOS: 1 day   Patient Care Team:  Steff Tellez MD as PCP - General (Internal Medicine)  Barbara Villarreal APRN as Nurse Practitioner (Cardiology)    Chief Complaint: Peripheral arterial disease (CMS/HCC)    Vital Signs:  Temp:  [97.5 °F (36.4 °C)-98.9 °F (37.2 °C)] 97.8 °F (36.6 °C)  Heart Rate:  [46-68] 56  Resp:  [14-18] 18  BP: ()/() 126/53    Physical Exam: Alert and conversant.  Feet are warm and viable with good quality Doppler signals in the posterior tibial and dorsalis pedis bilaterally       Results:   Results from last 7 days   Lab Units 06/05/19  0251   WBC 10*3/mm3 6.68   HEMOGLOBIN g/dL 11.5*   HEMATOCRIT % 35.8   PLATELETS 10*3/mm3 203     Results from last 7 days   Lab Units 06/05/19  0251   SODIUM mmol/L 130*   POTASSIUM mmol/L 4.1   CHLORIDE mmol/L 93*   CO2 mmol/L 26.0   BUN mg/dL 31*   CREATININE mg/dL 0.84   GLUCOSE mg/dL 123*   CALCIUM mg/dL 8.8           Imaging Results (last 24 hours)     Procedure Component Value Units Date/Time    XR [facility] OR Procedure [[id number]] Resulted:  06/04/19 1419     Updated:  06/04/19 1419          Assessment      Peripheral arterial disease (CMS/HCC)    Former smoker    COPD (chronic obstructive pulmonary disease) (CMS/HCC)    A-fib (CMS/[facility])    Status post femoral to femoral bypass and left femoral to popliteal bypass.  Nurses report palpable pulses now I am unable to palpate a distinct pulse but the Doppler signals have fairly good quality bilaterally.  Patient also states that her rest pain has completely resolved.  We are continuing on her home aspirin and Plavix.  She still has very tenuous anatomy and I doubt any significant further surgeries could be performed to revascularize the left leg.    Plan   May start to ambulate with assistance at noon today.  Do not sit at 90 degrees.    Please note that portions of this note were completed with a voice recognition program. Efforts were made to edit the dictations,  but occasionally words are mistranscribed.    Keyur Maki MD  06/05/19  7:20 AM

## 2019-06-05 NOTE — PROGRESS NOTES
Clinical Nutrition     Multidisciplinary Rounds      Patient Name: Kathleen Allen  Date of Encounter: 06/05/19 10:10 AM  MRN: [id number]  Admission date: 6/4/2019     MAJOR DOUGLAS to continue to follow per protocol.     Current diet: Diet Regular; Cardiac  No active supplement orders       Intervention:  Follow treatment plan  Care plan reviewed    Follow up:   Per protocol      Rena Hanson RD  10:10 AM  Time: 20min

## 2019-06-05 NOTE — PROGRESS NOTES
Discharge Planning Assessment  [facility]     Patient Name: Kathleen Allen  MRN: [id number]  Today's Date: 6/5/2019    Admit Date: 6/4/2019    Discharge Needs Assessment     Row Name 06/05/19 1051       Living Environment    Lives With  spouse    Current Living Arrangements  home/apartment/condo    Primary Care Provided by  self    Provides Primary Care For  no one    Family Caregiver if Needed  child(eric), adult;spouse    Quality of Family Relationships  supportive    Able to Return to Prior Arrangements  yes       Resource/Environmental Concerns    Resource/Environmental Concerns  none    Transportation Concerns  car, none       Transition Planning    Patient/Family Anticipates Transition to  home    Patient/Family Anticipated Services at Transition  none    Transportation Anticipated  family or friend will provide       Discharge Needs Assessment    Readmission Within the Last 30 Days  no previous admission in last 30 days    Concerns to be Addressed  denies needs/concerns at this time    Equipment Currently Used at Home  none    Anticipated Changes Related to Illness  none    Equipment Needed After Discharge  none        Discharge Plan     Row Name 06/05/19 1052       Plan    Plan  Home    Patient/Family in Agreement with Plan  yes    Plan Comments  Spoke with patient at bedside.  Patient resides in [facility] and lives with her .  Prior to admission patient was independent with ADL's and mobility.  Patient has a cane and walker at home that she used after a knee replacement but no longer uses them.  Patient has had [facility] home [facility] previously after her knee replacement but is not longer followed by them.  Patient has some concerns over her medication costs.  Patient states the medications she is on is affordable but they are starting Plavix as a new medication and she isn't sure what the cost will be.  Spoke with Armin at [facility] at [phone] and he states he can't  give exact prive without running a script but cash price for a one month supply would be $9.00 which he states it would be lower with insurance coverage.  Patient plans to go home upon discharge and denies any other needs.  CM will let patient no cost and will continue to follow.        Destination      No service coordination in this encounter.      Durable Medical Equipment      No service coordination in this encounter.      Dialysis/Infusion      No service coordination in this encounter.      Home Medical Care      No service coordination in this encounter.      Therapy      No service coordination in this encounter.      Community Resources      No service coordination in this encounter.        Expected Discharge Date and Time     Expected Discharge Date Expected Discharge Time    Jun 8, 2019         Demographic Summary     Row Name 06/05/19 1051       General Information    Admission Type  inpatient    Arrived From  home    Referral Source  admission list    Reason for Consult  discharge planning    Preferred Language  English       Contact Information    Permission Granted to Share Info With          Functional Status    No documentation.       Psychosocial    No documentation.       Abuse/Neglect    No documentation.       Legal    No documentation.       Substance Abuse    No documentation.       Patient Forms    No documentation.           Kandy Latif RN

## 2019-06-05 NOTE — PLAN OF CARE
Problem: Fall Risk (Adult)  Goal: Absence of Fall  Outcome: Ongoing (interventions implemented as appropriate)      Problem: Skin Injury Risk (Adult)  Goal: Skin Health and Integrity  Outcome: Ongoing (interventions implemented as appropriate)      Problem: Tissue Perfusion, Ineffective Peripheral (Adult)  Goal: Identify Related Risk Factors and Signs and Symptoms  Outcome: Ongoing (interventions implemented as appropriate)

## 2019-06-05 NOTE — PLAN OF CARE
Problem: Patient Care Overview  Goal: Plan of Care Review  Outcome: Ongoing (interventions implemented as appropriate)   06/05/19 0521   OTHER   Outcome Summary BP stable overnight, no cardene needed. Pedal pulses are palpable, will d/c rodriguez. Norco x1. 2LNC when sleeping   Coping/Psychosocial   Plan of Care Reviewed With patient   Plan of Care Review   Progress improving       Problem: Fall Risk (Adult)  Goal: Identify Related Risk Factors and Signs and Symptoms  Outcome: Outcome(s) achieved Date Met: 06/05/19    Goal: Absence of Fall  Outcome: Ongoing (interventions implemented as appropriate)      Problem: Skin Injury Risk (Adult)  Goal: Identify Related Risk Factors and Signs and Symptoms  Outcome: Outcome(s) achieved Date Met: 06/05/19    Goal: Skin Health and Integrity  Outcome: Ongoing (interventions implemented as appropriate)      Problem: Tissue Perfusion, Ineffective Peripheral (Adult)  Goal: Identify Related Risk Factors and Signs and Symptoms  Outcome: Ongoing (interventions implemented as appropriate)    Goal: Adequate Tissue Perfusion  Outcome: Ongoing (interventions implemented as appropriate)

## 2019-06-05 NOTE — PROGRESS NOTES
Intensive Care Follow-up      LOS: 1 day     Ms. Kathleen Allen, 83 y.o. female is followed for: Peripheral arterial disease (CMS/HCC)     Subjective - Interval History     Kathleen Allen is an 83 year old female former smoker with PMH significant for PAD of both lower extremities with associated claudication, especially in the left leg. The pain had been worsening over the past three months and CTA showed left superficial femoral artery occlusion and significant stenosis of the right external iliac artery.  Today she underwent femoral popliteal bypass, femoral femoral bypass per Dr. Maki and is in the ICU post-operatively. Patient has a history of tobacco abuse but stopped smoking about 10 years ago. Does not have home oxygen.    Patient oxygenating well on room air  On no continuous infusions  Complains of only minimal pain at groin incision sites    The patient's relevant past medical, surgical and social history were reviewed and updated in Epic as appropriate.     Objective     Infusions:    lactated ringers 9 mL/hr Last Rate: 9 mL/hr (06/04/19 0800)   niCARdipine 5-15 mg/hr Last Rate: Stopped (06/04/19 1648)   sodium chloride 50 mL/hr Last Rate: 50 mL/hr (06/04/19 1523)     Medications:    aspirin 81 mg Oral QAM   atenolol 25 mg Oral QAM   atorvastatin 20 mg Oral Nightly   clopidogrel 75 mg Oral Daily   levothyroxine 125 mcg Oral QAM   losartan-HCTZ (HYZAAR) 100-25 combo dose  Oral Daily   potassium chloride 20 mEq Oral Daily   torsemide 20 mg Oral Once per day on Mon Wed Fri     Intake/Output       06/04/19 0700 - 06/05/19 0659    Intake (ml) 1459.4    Output (ml) 1145    Net (ml) 314.4        Vital Sign Min/Max for last 24 hours  Temp  Min: 97.5 °F (36.4 °C)  Max: 98.9 °F (37.2 °C)   BP  Min: 99/71  Max: 156/60   Pulse  Min: 46  Max: 68   Resp  Min: 14  Max: 18   SpO2  Min: 73 %  Max: 100 %   Flow (L/min)  Min: 2  Max: 4        Physical Exam:   GENERAL: Awake, no distress   HEENT: No adenopathy or  thyromegaly   LUNGS: No wheezes or rhonchi   HEART: Regular rate and rhythm without murmurs   GI: Soft, nontender   EXTREMITIES: Groin sites without hematoma, bleeding, or drainage.  Toes are warm.   NEURO/PSYCH: Awake and alert.  Appropriate.  No deficit    Results from last 7 days   Lab Units 06/05/19  0251   WBC 10*3/mm3 6.68   HEMOGLOBIN g/dL 11.5*   PLATELETS 10*3/mm3 203     Results from last 7 days   Lab Units 06/05/19  0251 06/04/19  0828 06/03/19  1227   SODIUM mmol/L 130*  --  136   POTASSIUM mmol/L 4.1 3.4* 3.8   CO2 mmol/L 26.0  --  34.0*   BUN mg/dL 31*  --  39*   CREATININE mg/dL 0.84  --  0.98   GLUCOSE mg/dL 123*  --  102*     Estimated Creatinine Clearance: 46.4 mL/min (by C-G formula based on SCr of 0.84 mg/dL).              Lab Results   Component Value Date    LACTATE 1.3 06/05/2019          Images: Preoperative chest x-ray without effusions or consolidation    I reviewed the patient's results and images.     Impression      Active Hospital Problems    Diagnosis   • **Peripheral arterial disease (CMS/HCC)     · Femoral to femoral bypass with 8 mm PTFE and extended endarterectomy of the left profundus artery to provide blood flow to the left thigh  · Left femoral to left popliteal bypass with 6 mm PTFE graft.         • Former smoker   • COPD (chronic obstructive pulmonary disease) (CMS/HCC)        • A-fib (CMS/HCC)     on coumadin./ sees Dr. Bailey               Plan        Follow-up sodium  Mobilize when okay with Dr. Maki  Continue home medical regimen   Plan of care and goals reviewed with Multidisciplinary Team and Antibiotic Stewardship rounds   I discussed the patient's findings and my recommendations with patient, family and nursing staff      .     JUAN Martinez MD  Pulmonary and Critical Care Medicine

## 2019-06-06 LAB
ANION GAP SERPL CALCULATED.3IONS-SCNC: 12 MMOL/L
ANION GAP SERPL CALCULATED.3IONS-SCNC: 14 MMOL/L
BUN BLD-MCNC: 38 MG/DL (ref 8–23)
BUN BLD-MCNC: 39 MG/DL (ref 8–23)
BUN/CREAT SERPL: 38.8 (ref 7–25)
BUN/CREAT SERPL: 42.4 (ref 7–25)
CALCIUM SPEC-SCNC: 8.5 MG/DL (ref 8.6–10.5)
CALCIUM SPEC-SCNC: 9.1 MG/DL (ref 8.6–10.5)
CHLORIDE SERPL-SCNC: 86 MMOL/L (ref 98–107)
CHLORIDE SERPL-SCNC: 89 MMOL/L (ref 98–107)
CO2 SERPL-SCNC: 26 MMOL/L (ref 22–29)
CO2 SERPL-SCNC: 28 MMOL/L (ref 22–29)
CREAT BLD-MCNC: 0.92 MG/DL (ref 0.57–1)
CREAT BLD-MCNC: 0.98 MG/DL (ref 0.57–1)
CYTO UR: NORMAL
GFR SERPL CREATININE-BSD FRML MDRD: 54 ML/MIN/1.73
GFR SERPL CREATININE-BSD FRML MDRD: 58 ML/MIN/1.73
GLUCOSE BLD-MCNC: 100 MG/DL (ref 65–99)
GLUCOSE BLD-MCNC: 103 MG/DL (ref 65–99)
LAB AP CASE REPORT: NORMAL
LAB AP CLINICAL INFORMATION: NORMAL
PATH REPORT.FINAL DX SPEC: NORMAL
PATH REPORT.GROSS SPEC: NORMAL
POTASSIUM BLD-SCNC: 3.5 MMOL/L (ref 3.5–5.2)
POTASSIUM BLD-SCNC: 3.6 MMOL/L (ref 3.5–5.2)
SODIUM BLD-SCNC: 124 MMOL/L (ref 136–145)
SODIUM BLD-SCNC: 131 MMOL/L (ref 136–145)

## 2019-06-06 PROCEDURE — 97116 GAIT TRAINING THERAPY: CPT

## 2019-06-06 PROCEDURE — 99024 POSTOP FOLLOW-UP VISIT: CPT | Performed by: THORACIC SURGERY (CARDIOTHORACIC VASCULAR SURGERY)

## 2019-06-06 PROCEDURE — 80048 BASIC METABOLIC PNL TOTAL CA: CPT | Performed by: INTERNAL MEDICINE

## 2019-06-06 PROCEDURE — 25010000002 FUROSEMIDE PER 20 MG: Performed by: INTERNAL MEDICINE

## 2019-06-06 PROCEDURE — 99232 SBSQ HOSP IP/OBS MODERATE 35: CPT | Performed by: INTERNAL MEDICINE

## 2019-06-06 PROCEDURE — 97162 PT EVAL MOD COMPLEX 30 MIN: CPT

## 2019-06-06 RX ORDER — FUROSEMIDE 10 MG/ML
40 INJECTION INTRAMUSCULAR; INTRAVENOUS EVERY 12 HOURS
Status: COMPLETED | OUTPATIENT
Start: 2019-06-06 | End: 2019-06-06

## 2019-06-06 RX ORDER — POTASSIUM CHLORIDE 750 MG/1
40 CAPSULE, EXTENDED RELEASE ORAL AS NEEDED
Status: DISCONTINUED | OUTPATIENT
Start: 2019-06-06 | End: 2019-06-08 | Stop reason: HOSPADM

## 2019-06-06 RX ORDER — FUROSEMIDE 10 MG/ML
40 INJECTION INTRAMUSCULAR; INTRAVENOUS EVERY 12 HOURS
Status: DISCONTINUED | OUTPATIENT
Start: 2019-06-06 | End: 2019-06-06

## 2019-06-06 RX ORDER — SODIUM CHLORIDE 9 MG/ML
50 INJECTION, SOLUTION INTRAVENOUS CONTINUOUS
Status: DISCONTINUED | OUTPATIENT
Start: 2019-06-06 | End: 2019-06-07

## 2019-06-06 RX ADMIN — ASPIRIN 81 MG CHEWABLE TABLET 81 MG: 81 TABLET CHEWABLE at 06:42

## 2019-06-06 RX ADMIN — CLOPIDOGREL BISULFATE 75 MG: 75 TABLET ORAL at 08:34

## 2019-06-06 RX ADMIN — POTASSIUM CHLORIDE 40 MEQ: 750 CAPSULE, EXTENDED RELEASE ORAL at 06:42

## 2019-06-06 RX ADMIN — POTASSIUM CHLORIDE 20 MEQ: 750 CAPSULE, EXTENDED RELEASE ORAL at 08:33

## 2019-06-06 RX ADMIN — POTASSIUM CHLORIDE 40 MEQ: 750 CAPSULE, EXTENDED RELEASE ORAL at 22:17

## 2019-06-06 RX ADMIN — LEVOTHYROXINE SODIUM 125 MCG: 25 TABLET ORAL at 06:42

## 2019-06-06 RX ADMIN — POTASSIUM CHLORIDE 40 MEQ: 750 CAPSULE, EXTENDED RELEASE ORAL at 18:14

## 2019-06-06 RX ADMIN — ATORVASTATIN CALCIUM 20 MG: 20 TABLET, FILM COATED ORAL at 20:56

## 2019-06-06 RX ADMIN — ATENOLOL 25 MG: 25 TABLET ORAL at 08:32

## 2019-06-06 RX ADMIN — FUROSEMIDE 40 MG: 10 INJECTION, SOLUTION INTRAMUSCULAR; INTRAVENOUS at 12:18

## 2019-06-06 RX ADMIN — HYDROCHLOROTHIAZIDE: 25 TABLET ORAL at 08:33

## 2019-06-06 RX ADMIN — POTASSIUM CHLORIDE 40 MEQ: 750 CAPSULE, EXTENDED RELEASE ORAL at 12:09

## 2019-06-06 RX ADMIN — SODIUM CHLORIDE 50 ML/HR: 9 INJECTION, SOLUTION INTRAVENOUS at 12:19

## 2019-06-06 NOTE — PROGRESS NOTES
Clinical Nutrition     Multidisciplinary Rounds      Patient Name: Kathleen Allen  Date of Encounter: 06/06/19 10:49 AM  MRN: [id number]  Admission date: 6/4/2019      Reason for visit: EDWIN. RD to continue to follow per protocol.     Pt sitting up in chair, alert, oriented, reports she does not normally eat breakfast but has a good appetite later in the day    Current diet: Diet Regular; Cardiac  Fluid restriction 400ml per tray ( 1200ml per day)    Intervention:  Follow treatment plan  Care plan reviewed  Menu adjusted    Follow up:   Per protocol      Rena Hanson RD  10:49 AM  Time: 20min

## 2019-06-06 NOTE — PLAN OF CARE
Problem: Patient Care Overview  Goal: Plan of Care Review  Outcome: Ongoing (interventions implemented as appropriate)   06/06/19 1029   OTHER   Outcome Summary PT initial evaluation completed; pt demonstrates decreased indep and functional endurance re: mobility tasks (with evolving signs/symptoms), warranting further skilled PT services to promote PLOF. Limited today by fatigue and c/o groin soreness with gait. Recommend d/c home with assist and initial use of RWx.    Coping/Psychosocial   Plan of Care Reviewed With patient;daughter

## 2019-06-06 NOTE — PROGRESS NOTES
Intensive Care Follow-up      LOS: 2 days     Ms. Kathleen Allen, 83 y.o. female is followed for: Glycemic, Electrolyte, Respiratory, and Medical management     Subjective - Interval History     Kathleen Allen is an 83 year old female former smoker with PMH significant for PAD of both lower extremities with associated claudication, especially in the left leg. The pain had been worsening over the past three months and CTA showed left superficial femoral artery occlusion and significant stenosis of the right external iliac artery.  Today she underwent femoral popliteal bypass, femoral femoral bypass per Dr. Maki and is in the ICU post-operatively. Patient has a history of tobacco abuse but stopped smoking about 10 years ago. Does not have home oxygen.     No problems overnight  Up with assistance  Oxygenating adequately on room air  Serum sodium decreased this morning    The patient's relevant past medical, surgical and social history were reviewed and updated in Epic as appropriate.     Objective     Infusions:    niCARdipine 5-15 mg/hr Last Rate: Stopped (06/04/19 1648)   sodium chloride 50 mL/hr Last Rate: 50 mL/hr (06/05/19 1511)   sodium chloride 50 mL/hr      Medications:    aspirin 81 mg Oral QAM   atenolol 25 mg Oral QAM   atorvastatin 20 mg Oral Nightly   clopidogrel 75 mg Oral Daily   furosemide 40 mg Intravenous Q12H   levothyroxine 125 mcg Oral QAM   losartan-HCTZ (HYZAAR) 100-25 combo dose  Oral Daily   potassium chloride 20 mEq Oral Daily     Intake/Output       06/05/19 0700 - 06/06/19 0659 06/06/19 0700 - 06/07/19 0659    Intake (ml) 1488.7 --    Output (ml) 2825 250    Net (ml) -1336.3 -250    Last Weight  56.7 kg (125 lb 0 oz)  --        Vital Sign Min/Max for last 24 hours  Temp  Min: 97.7 °F (36.5 °C)  Max: 98.3 °F (36.8 °C)   BP  Min: 101/42  Max: 144/75   Pulse  Min: 58  Max: 74   Resp  Min: 14  Max: 20   SpO2  Min: 92 %  Max: 99 %   No Data Recorded        Physical Exam:   GENERAL:  Awake, no distress   HEENT: No adenopathy or thyromegaly   LUNGS: Decreased breath sounds bilaterally without wheezes   HEART: Regular rate and rhythm without murmurs   GI: Soft, nontender   EXTREMITIES: Groin sites without hematoma or bleeding.  Warm toes bilaterally   NEURO/PSYCH: Awake, alert, appropriate    Results from last 7 days   Lab Units 06/05/19  0251   WBC 10*3/mm3 6.68   HEMOGLOBIN g/dL 11.5*   PLATELETS 10*3/mm3 203     Results from last 7 days   Lab Units 06/06/19  0433 06/05/19  0251 06/04/19  0828 06/03/19  1227   SODIUM mmol/L 124* 130*  --  136   POTASSIUM mmol/L 3.5 4.1 3.4* 3.8   CO2 mmol/L 26.0 26.0  --  34.0*   BUN mg/dL 38* 31*  --  39*   CREATININE mg/dL 0.98 0.84  --  0.98   GLUCOSE mg/dL 100* 123*  --  102*     Estimated Creatinine Clearance: 39.6 mL/min (by C-G formula based on SCr of 0.98 mg/dL).              Lab Results   Component Value Date    LACTATE 1.3 06/05/2019          Images: Preoperative chest x-ray without consolidation or effusions    I reviewed the patient's results and images.     Impression      Active Hospital Problems    Diagnosis   • **Peripheral arterial disease (CMS/HCC)     · Femoral to femoral bypass with 8 mm PTFE and extended endarterectomy of the left profundus artery to provide blood flow to the left thigh  · Left femoral to left popliteal bypass with 6 mm PTFE graft.         • Former smoker   • COPD (chronic obstructive pulmonary disease) (CMS/HCC)        • A-fib (CMS/HCC)     on coumadin./ sees Dr. Bailey            Plan        Restrict free water  Some normal saline and a dose of Lasix  Follow-up on sodium level and renal function  Mobilize with physical therapy   Plan of care and goals reviewed with Multidisciplinary Team and Antibiotic Stewardship rounds   I discussed the patient's findings and my recommendations with patient and nursing staff      .     JUAN Martinez MD  Pulmonary and Critical Care Medicine

## 2019-06-06 NOTE — THERAPY EVALUATION
Acute Care - Physical Therapy Initial Evaluation  [facility]     Patient Name: Kathleen Allen  : 1935  MRN: [id number]  Today's Date: 2019   Onset of Illness/Injury or Date of Surgery: 19  Date of Referral to PT: 19  Referring Physician: GFITY Ivy      Admit Date: 2019    Visit Dx:     ICD-10-CM ICD-9-CM   1. Impaired functional mobility, balance, gait, and endurance Z74.09 V49.89   2. PAD (peripheral artery disease) (CMS/HCC) I73.9 443.9   3. Peripheral arterial disease (CMS/HCC) I73.9 443.9     Patient Active Problem List   Diagnosis   • Hypothyroidism   • H/O lymph node cancer   • Paroxysmal atrial fibrillation (CMS/HCC)   • Essential hypertension   • Malaise and fatigue   • Shortness of breath   • Atherosclerosis of native artery of both lower extremities with intermittent claudication (CMS/HCC)    • Abnormal ultrasound of lower extremity   • Palpitations   • Peripheral edema   • Peripheral arterial disease (CMS/HCC)   • Former smoker   • COPD (chronic obstructive pulmonary disease) (CMS/HCC)   • A-fib (CMS/HCC)     Past Medical History:   Diagnosis Date   • A-fib (CMS/HCC)     on coumadin./ sees Dr. 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; chemo and radiation    • Peripheral vascular disease (CMS/HCC)    • PONV (postoperative nausea and vomiting)    • Shortness of breath    • Wears dentures     upper and lower      Past Surgical History:   Procedure Laterality Date   • ANGIOPLASTY ILIAC ARTERY N/A 2019    Procedure: ANGIOGRAM, FEM FEM BYPASS, LEFT FEM POP, ENDARECTOMY OF LEFT POPLITEAL;  Surgeon: Keyur Maki MD;  Location: [place] [address];  Service: Vascular   • AORTAGRAM N/A  5/28/2019    Procedure: ABDOMINAL AORTAGRAM WITH PERIPHERAL RUNOFFS;  Surgeon: Keyur Maki MD;  Location:  [facility];  Service: Vascular   • APPENDECTOMY     • CATARACT EXTRACTION, BILATERAL     • COLONOSCOPY  2014   • FEMORAL POPLITEAL BYPASS Left 6/4/2019    Procedure: FEMORAL POPLITEAL BYPASS, FEMORAL FEMORAL BYPASS;  Surgeon: Keyur Maki MD;  Location:  [facility];  Service: Vascular   • OTHER SURGICAL HISTORY Left 2008    Left Groin for Lymphoma. chemo/radiation   • POLYPECTOMY     • REPLACEMENT TOTAL KNEE Left 2013        PT ASSESSMENT (last 12 hours)      Physical Therapy Evaluation     Row Name 06/06/19 1029          PT Evaluation Time/Intention    Subjective Information  no complaints  -LS     Document Type  evaluation  -LS     Mode of Treatment  physical therapy  -LS     Patient Effort  good  -LS     Symptoms Noted During/After Treatment  fatigue  -LS     Row Name 06/06/19 1029          General Information    Patient Profile Reviewed?  yes  -LS     Onset of Illness/Injury or Date of Surgery  06/04/19  -LS     Referring Physician  GIFTY Ivy  -LS     Patient Observations  alert;cooperative;agree to therapy  -LS     Prior Level of Function  independent:;gait;transfer;ADL's;bathing;dressing  -LS     Equipment Currently Used at Home  cane, straight;walker, rolling  -LS     Pertinent History of Current Functional Problem  Admitted for surgical management of PVD with resting LE pain. s/p Fem-fem bypass with L fem pop on 6/4.  -LS     Existing Precautions/Restrictions  fall  -LS     Risks Reviewed  patient and family:;LOB;dizziness;increased discomfort;change in vital signs  -LS     Benefits Reviewed  patient and family:;improve function;increase independence;increase strength;increase balance;increase knowledge  -LS     Barriers to Rehab  none identified  -LS     Row Name 06/06/19 1029          Relationship/Environment    Lives With  spouse  -LS     Row Name 06/06/19 1029           Resource/Environmental Concerns    Current Living Arrangements  home/apartment/condo  -     Row Name 06/06/19 1029          Home Main Entrance    Number of Stairs, Main Entrance  five from back; 3 from front entrance  -     Row Name 06/06/19 1029          Cognitive Assessment/Interventions    Additional Documentation  Cognitive Assessment/Intervention (Group)  -     Row Name 06/06/19 1029          Cognitive Assessment/Intervention- PT/OT    Affect/Mental Status (Cognitive)  WFL  -     Orientation Status (Cognition)  oriented x 4  -LS     Follows Commands (Cognition)  follows one step commands;over 90% accuracy;verbal cues/prompting required  -     Safety Deficit (Cognitive)  mild deficit;insight into deficits/self awareness;safety precautions awareness  -     Personal Safety Interventions  fall prevention program maintained;gait belt;nonskid shoes/slippers when out of bed  -     Row Name 06/06/19 1029          Safety Issues, Functional Mobility    Impairments Affecting Function (Mobility)  balance;endurance/activity tolerance;pain;strength;shortness of breath  -     Row Name 06/06/19 1029          Bed Mobility Assessment/Treatment    Comment (Bed Mobility)  UIC  -     Row Name 06/06/19 1029          Gait/Stairs Assessment/Training    64160 - Gait Training Minutes   9  -LS     Gait/Stairs Assessment/Training  gait/ambulation assistive device  -     [place] Level (Gait)  contact guard;verbal cues  -     Assistive Device (Gait)  walker, front-wheeled  -     Distance in Feet (Gait)  230  -LS     Pattern (Gait)  step-through  -LS     Deviations/Abnormal Patterns (Gait)  left sided deviations;antalgic;stride length decreased  -LS     Comment (Gait/Stairs)  VC for upright posture, keeping RW close to body, and even step length R vs L.   -     Row Name 06/06/19 1029          General ROM    GENERAL ROM COMMENTS  BLE grossly WFL  -     Row Name 06/06/19 1029          MMT (Manual Muscle  Testing)    General MMT Comments  BLE grossly at least 3+/5 as demonstrated functionally   -[facility] Name 06/06/19 1029          Motor Assessment/Intervention    Additional Documentation  Balance (Group);Therapeutic Exercise (Group)  -[facility] Name 06/06/19 1029          Balance    Balance  static sitting balance;static standing balance  -LS     Row Name 06/06/19 1029          Static Sitting Balance    Level of [place] (Unsupported Sitting, Static Balance)  supervision  -     Sitting Position (Unsupported Sitting, Static Balance)  sitting in chair  -[facility] Name 06/06/19 1029          Static Standing Balance    Level of [place] (Supported Standing, Static Balance)  contact guard assist  -     Assistive Device Utilized (Supported Standing, Static Balance)  walker, rolling  -     Row Name 06/06/19 1029          Sensory Assessment/Intervention    Sensory General Assessment  no sensation deficits identified BLE  -[facility] Name 06/06/19 1029          Pain Assessment    Additional Documentation  Pain Scale: Numbers Pre/Post-Treatment (Group)  -LS     Row Name 06/06/19 1029          Pain Scale: Numbers Pre/Post-Treatment    Pain Scale: Numbers, Pretreatment  0/10 - no pain  -     Pain Scale: Numbers, Post-Treatment  0/10 - no pain  -LS     Row Name             Wound 06/04/19 1128 Bilateral groin incision    Wound - Properties Group Date first assessed: 06/04/19  -SH Time first assessed: 1128  -SH Side: Bilateral  -SH Location: groin  -SH Type: incision  -SH    Row Name             Wound 06/04/19 1128 Left leg incision    Wound - Properties Group Date first assessed: 06/04/19  -SH Time first assessed: 1128  -SH Side: Left  -SH Location: leg  -SH Type: incision  -SH    Row Name 06/06/19 1029          Plan of Care Review    Plan of Care Reviewed With  patient;daughter  -[facility] Name 06/06/19 1029          Physical Therapy Clinical Impression    Date of Referral to PT  06/06/19  -     PT  Diagnosis (PT Clinical Impression)  impaired functional mobility, balance, gait  -LS     Patient/Family Goals Statement (PT Clinical Impression)  return to OF  -     Criteria for Skilled Interventions Met (PT Clinical Impression)  yes;treatment indicated  -LS     Rehab Potential (PT Clinical Summary)  good, to achieve stated therapy goals  -LS     Care Plan Review (PT)  evaluation/treatment results reviewed  -     Row Name 06/06/19 1029          Vital Signs    Pre Systolic BP Rehab  144  -LS     Pre Treatment Diastolic BP  75  -LS     Post Systolic BP Rehab  145  -LS     Post Treatment Diastolic BP  65  -LS     Pretreatment Heart Rate (beats/min)  67  -LS     Posttreatment Heart Rate (beats/min)  66  -LS     Pre SpO2 (%)  97  -LS     O2 Delivery Pre Treatment  room air  -LS     Post SpO2 (%)  97  -LS     O2 Delivery Post Treatment  room air  -LS     Pre Patient Position  Sitting  -LS     Intra Patient Position  Standing  -LS     Post Patient Position  Sitting  -LS     Row Name 06/06/19 1029          Physical Therapy Goals    Bed Mobility Goal Selection (PT)  bed mobility, PT goal 1  -LS     Transfer Goal Selection (PT)  transfer, PT goal 1  -LS     Gait Training Goal Selection (PT)  gait training, PT goal 1  -LS     Stairs Goal Selection (PT)  stairs, PT goal 1  -LS     Additional Documentation  Stairs Goal Selection (PT) (Row)  -     Row Name 06/06/19 1029          Bed Mobility Goal 1 (PT)    Activity/Assistive Device (Bed Mobility Goal 1, PT)  sit to supine/supine to sit  -LS     [place] Level/Cues Needed (Bed Mobility Goal 1, PT)  independent  -LS     Time Frame (Bed Mobility Goal 1, PT)  2 weeks  -LS     Progress/Outcomes (Bed Mobility Goal 1, PT)  goal ongoing  -     Row Name 06/06/19 1029          Transfer Goal 1 (PT)    Activity/Assistive Device (Transfer Goal 1, PT)  sit-to-stand/stand-to-sit;walker, rolling  -LS     [place] Level/Cues Needed (Transfer Goal 1, PT)  conditional  independence  -LS     Time Frame (Transfer Goal 1, PT)  2 weeks  -LS     Progress/Outcome (Transfer Goal 1, PT)  goal ongoing  -LS     Row Name 06/06/19 1029          Gait Training Goal 1 (PT)    Activity/Assistive Device (Gait Training Goal 1, PT)  gait (walking locomotion);walker, rolling  -LS     [place] Level (Gait Training Goal 1, PT)  conditional independence  -LS     Distance (Gait Goal 1, PT)  300  -LS     Time Frame (Gait Training Goal 1, PT)  2 weeks  -LS     Progress/Outcome (Gait Training Goal 1, PT)  goal ongoing  -LS     Row Name 06/06/19 1029          Stairs Goal 1 (PT)    Activity/Assistive Device (Stairs Goal 1, PT)  ascending stairs;descending stairs  -LS     [place] Level/Cues Needed (Stairs Goal 1, PT)  contact guard assist  -LS     Number of Stairs (Stairs Goal 1, PT)  5  -LS     Time Frame (Stairs Goal 1, PT)  2 weeks  -LS     Progress/Outcome (Stairs Goal 1, PT)  goal ongoing  -LS     Row Name 06/06/19 1029          Positioning and Restraints    Pre-Treatment Position  sitting in chair/recliner  -LS     Post Treatment Position  chair  -LS     In Chair  notified nsg;reclined;call light within reach;encouraged to call for assist;exit alarm on;with family/caregiver;waffle cushion;legs elevated;heels elevated  -LS     Row Name 06/06/19 1029          Living Environment    Home Accessibility  stairs to enter home  -LS       User Key  (r) = Recorded By, (t) = Taken By, (c) = Cosigned By    Initials Name Provider Type    Sasha Altman PT Physical Therapist    SH Haase, Sherri L, RN Registered Nurse        Physical Therapy Education     Title: PT OT SLP Therapies (Done)     Topic: Physical Therapy (Done)     Point: Mobility training (Done)     Learning Progress Summary           Patient Acceptance, E,D, VU,NR by RAE at 6/6/2019 10:29 AM                   Point: Home exercise program (Done)     Learning Progress Summary           Patient Acceptance, E,D, VU,NR by RAE at 6/6/2019 10:29 AM                    Point: Body mechanics (Done)     Learning Progress Summary           Patient Acceptance, E,D, VU,NR by  at 6/6/2019 10:29 AM                   Point: Precautions (Done)     Learning Progress Summary           Patient Acceptance, E,D, VU,NR by  at 6/6/2019 10:29 AM                               User Key     Initials Effective Dates Name Provider Type Discipline     06/19/15 -  Sasha Cutler, PT Physical Therapist PT              PT Recommendation and Plan  Anticipated Discharge Disposition (PT): home with assist  Planned Therapy Interventions (PT Eval): balance training, bed mobility training, gait training, home exercise program, patient/family education, stair training, strengthening, transfer training  Therapy Frequency (PT Clinical Impression): daily  Outcome Summary/Treatment Plan (PT)  Anticipated Discharge Disposition (PT): home with assist  Plan of Care Reviewed With: patient, daughter  Outcome Summary: PT initial evaluation completed; pt demonstrates decreased indep and functional endurance re: mobility tasks (with evolving signs/symptoms), warranting further skilled PT services to promote PLOF. Limited today by fatigue and c/o groin soreness with gait. Recommend d/c home with assist and initial use of RWx.   Outcome Measures     Row Name 06/06/19 1029             How much help from another person do you currently need...    Turning from your back to your side while in flat bed without using bedrails?  3  -LS      Moving from lying on back to sitting on the side of a flat bed without bedrails?  3  -LS      Moving to and from a bed to a chair (including a wheelchair)?  3  -LS      Standing up from a chair using your arms (e.g., wheelchair, bedside chair)?  3  -LS      Climbing 3-5 steps with a railing?  2  -LS      To walk in hospital room?  3  -LS      AM-PAC 6 Clicks Score  17  -LS         Functional Assessment    Outcome Measure Options  AM-PAC 6 Clicks Basic Mobility (PT)  -LS         User Key  (r) = Recorded By, (t) = Taken By, (c) = Cosigned By    Initials Name Provider Type    Sasha Altman, PT Physical Therapist         Time Calculation:   PT Charges     Row Name 06/06/19 1029             Time Calculation    Start Time  1029  -LS      PT Received On  06/06/19  -      PT Goal Re-Cert Due Date  06/16/19  -LS         Time Calculation- PT    Total Timed Code Minutes- PT  9 minute(s)  -LS         Timed Charges    26508 - Gait Training Minutes   9  -LS        User Key  (r) = Recorded By, (t) = Taken By, (c) = Cosigned By    Initials Name Provider Type    Sasha Altman, PT Physical Therapist        Therapy Charges for Today     Code Description Service Date Service Provider Modifiers Qty    51761492892 HC PT EVAL MOD COMPLEXITY 3 6/6/2019 Sasha Cutler, PT GP 1    61299711820 HC GAIT TRAINING EA 15 MIN 6/6/2019 Sasha Cutler, PT GP 1          PT G-Codes  Outcome Measure Options: AM-PAC 6 Clicks Basic Mobility (PT)  AM-PAC 6 Clicks Score: 17      Sasha Cutler, PT  6/6/2019

## 2019-06-06 NOTE — PLAN OF CARE
Problem: Patient Care Overview  Goal: Plan of Care Review  Outcome: Ongoing (interventions implemented as appropriate)   06/06/19 0608   OTHER   Outcome Summary VSS. >2L UOP. No pain meds needed overnight. Pulses intermittently palpable   Coping/Psychosocial   Plan of Care Reviewed With patient   Plan of Care Review   Progress improving       Problem: Fall Risk (Adult)  Goal: Absence of Fall  Outcome: Ongoing (interventions implemented as appropriate)      Problem: Skin Injury Risk (Adult)  Goal: Skin Health and Integrity  Outcome: Ongoing (interventions implemented as appropriate)

## 2019-06-07 PROBLEM — Z98.890 S/P PERIPHERAL ARTERY BYPASS: Status: ACTIVE | Noted: 2019-06-07

## 2019-06-07 LAB
ABO + RH BLD: NORMAL
ABO + RH BLD: NORMAL
ALBUMIN SERPL-MCNC: 2.8 G/DL (ref 3.5–5.2)
ALBUMIN/GLOB SERPL: 0.9 G/DL
ALP SERPL-CCNC: 65 U/L (ref 39–117)
ALT SERPL W P-5'-P-CCNC: 14 U/L (ref 1–33)
ANION GAP SERPL CALCULATED.3IONS-SCNC: 11 MMOL/L
AST SERPL-CCNC: 28 U/L (ref 1–32)
BH BB BLOOD EXPIRATION DATE: NORMAL
BH BB BLOOD EXPIRATION DATE: NORMAL
BH BB BLOOD TYPE BARCODE: 6200
BH BB BLOOD TYPE BARCODE: 6200
BH BB DISPENSE STATUS: NORMAL
BH BB DISPENSE STATUS: NORMAL
BH BB PRODUCT CODE: NORMAL
BH BB PRODUCT CODE: NORMAL
BH BB UNIT NUMBER: NORMAL
BH BB UNIT NUMBER: NORMAL
BILIRUB SERPL-MCNC: 1.1 MG/DL (ref 0.2–1.2)
BUN BLD-MCNC: 37 MG/DL (ref 8–23)
BUN/CREAT SERPL: 45.1 (ref 7–25)
CA-I SERPL ISE-MCNC: 1.22 MMOL/L (ref 1.12–1.32)
CALCIUM SPEC-SCNC: 8.8 MG/DL (ref 8.6–10.5)
CHLORIDE SERPL-SCNC: 94 MMOL/L (ref 98–107)
CO2 SERPL-SCNC: 26 MMOL/L (ref 22–29)
CREAT BLD-MCNC: 0.82 MG/DL (ref 0.57–1)
DEPRECATED RDW RBC AUTO: 52.9 FL (ref 37–54)
ERYTHROCYTE [DISTWIDTH] IN BLOOD BY AUTOMATED COUNT: 14.8 % (ref 12.3–15.4)
GFR SERPL CREATININE-BSD FRML MDRD: 67 ML/MIN/1.73
GLOBULIN UR ELPH-MCNC: 3.2 GM/DL
GLUCOSE BLD-MCNC: 97 MG/DL (ref 65–99)
HCT VFR BLD AUTO: 34.1 % (ref 34–46.6)
HGB BLD-MCNC: 11.2 G/DL (ref 12–15.9)
MAGNESIUM SERPL-MCNC: 1.8 MG/DL (ref 1.6–2.4)
MCH RBC QN AUTO: 32.1 PG (ref 26.6–33)
MCHC RBC AUTO-ENTMCNC: 32.8 G/DL (ref 31.5–35.7)
MCV RBC AUTO: 97.7 FL (ref 79–97)
PHOSPHATE SERPL-MCNC: 2.3 MG/DL (ref 2.5–4.5)
PLATELET # BLD AUTO: 198 10*3/MM3 (ref 140–450)
PMV BLD AUTO: 9.7 FL (ref 6–12)
POTASSIUM BLD-SCNC: 4.2 MMOL/L (ref 3.5–5.2)
PROT SERPL-MCNC: 6 G/DL (ref 6–8.5)
RBC # BLD AUTO: 3.49 10*6/MM3 (ref 3.77–5.28)
SODIUM BLD-SCNC: 131 MMOL/L (ref 136–145)
UNIT  ABO: NORMAL
UNIT  ABO: NORMAL
UNIT  RH: NORMAL
UNIT  RH: NORMAL
WBC NRBC COR # BLD: 7.54 10*3/MM3 (ref 3.4–10.8)

## 2019-06-07 PROCEDURE — 99024 POSTOP FOLLOW-UP VISIT: CPT | Performed by: THORACIC SURGERY (CARDIOTHORACIC VASCULAR SURGERY)

## 2019-06-07 PROCEDURE — 84100 ASSAY OF PHOSPHORUS: CPT | Performed by: INTERNAL MEDICINE

## 2019-06-07 PROCEDURE — 83735 ASSAY OF MAGNESIUM: CPT | Performed by: INTERNAL MEDICINE

## 2019-06-07 PROCEDURE — 97530 THERAPEUTIC ACTIVITIES: CPT

## 2019-06-07 PROCEDURE — 80053 COMPREHEN METABOLIC PANEL: CPT | Performed by: INTERNAL MEDICINE

## 2019-06-07 PROCEDURE — 85027 COMPLETE CBC AUTOMATED: CPT | Performed by: INTERNAL MEDICINE

## 2019-06-07 PROCEDURE — 99232 SBSQ HOSP IP/OBS MODERATE 35: CPT | Performed by: INTERNAL MEDICINE

## 2019-06-07 PROCEDURE — 97116 GAIT TRAINING THERAPY: CPT

## 2019-06-07 PROCEDURE — 82330 ASSAY OF CALCIUM: CPT | Performed by: INTERNAL MEDICINE

## 2019-06-07 RX ADMIN — POTASSIUM CHLORIDE 20 MEQ: 750 CAPSULE, EXTENDED RELEASE ORAL at 09:07

## 2019-06-07 RX ADMIN — HYDROCHLOROTHIAZIDE: 25 TABLET ORAL at 09:07

## 2019-06-07 RX ADMIN — LEVOTHYROXINE SODIUM 125 MCG: 25 TABLET ORAL at 06:00

## 2019-06-07 RX ADMIN — ATORVASTATIN CALCIUM 20 MG: 20 TABLET, FILM COATED ORAL at 20:31

## 2019-06-07 RX ADMIN — ASPIRIN 81 MG CHEWABLE TABLET 81 MG: 81 TABLET CHEWABLE at 06:00

## 2019-06-07 RX ADMIN — SODIUM CHLORIDE 50 ML/HR: 9 INJECTION, SOLUTION INTRAVENOUS at 07:08

## 2019-06-07 RX ADMIN — ATENOLOL 25 MG: 25 TABLET ORAL at 09:07

## 2019-06-07 RX ADMIN — CLOPIDOGREL BISULFATE 75 MG: 75 TABLET ORAL at 09:07

## 2019-06-07 NOTE — PROGRESS NOTES
Intensive Care Follow-up      LOS: 3 days     Ms. Kathleen Allen, 83 y.o. female is followed for: Glycemic, Electrolyte, Respiratory, and Medical management     Subjective - Interval History     Kathleen Allen is an 83 year old female former smoker with PMH significant for PAD of both lower extremities with associated claudication, especially in the left leg. The pain had been worsening over the past three months and CTA showed left superficial femoral artery occlusion and significant stenosis of the right external iliac artery.  Today she underwent femoral popliteal bypass, femoral femoral bypass per Dr. Maki and is in the ICU post-operatively. Patient has a history of tobacco abuse but stopped smoking about 10 years ago. Does not have home oxygen.     No problems overnight  Oxygenating adequately on room air  On no continuous infusions  Up with physical therapy  Sodium has improved with fluid restriction    The patient's relevant past medical, surgical and social history were reviewed and updated in Epic as appropriate.     Objective     Infusions:    niCARdipine 5-15 mg/hr Last Rate: Stopped (06/04/19 1648)     Medications:    aspirin 81 mg Oral QAM   atenolol 25 mg Oral QAM   atorvastatin 20 mg Oral Nightly   clopidogrel 75 mg Oral Daily   levothyroxine 125 mcg Oral QAM   losartan-HCTZ (HYZAAR) 100-25 combo dose  Oral Daily   potassium chloride 20 mEq Oral Daily     Intake/Output       06/06/19 0700 - 06/07/19 0659 06/07/19 0700 - 06/08/19 0659    Intake (ml) 1835.5 696    Output (ml) 3475 250    Net (ml) -1639.5 446        Vital Sign Min/Max for last 24 hours  Temp  Min: 97.9 °F (36.6 °C)  Max: 98.5 °F (36.9 °C)   BP  Min: 98/77  Max: 155/56   Pulse  Min: 56  Max: 78   Resp  Min: 16  Max: 20   SpO2  Min: 92 %  Max: 100 %   No Data Recorded        Physical Exam:   GENERAL: Awake, no distress   HEENT: No adenopathy or thyromegaly   LUNGS: Decreased breath sounds bilaterally without wheezes   HEART:  Regular rate and rhythm with grade 2/6 systolic murmur   GI: Soft, nontender   EXTREMITIES: Feet are warm.  Groin site without hematoma no cyanosis   NEURO/PSYCH: Awake, alert, follows commands.  No deficits    Results from last 7 days   Lab Units 06/07/19  0418 06/05/19  0251   WBC 10*3/mm3 7.54 6.68   HEMOGLOBIN g/dL 11.2* 11.5*   PLATELETS 10*3/mm3 198 203     Results from last 7 days   Lab Units 06/07/19  0418 06/06/19  1647 06/06/19  0433   SODIUM mmol/L 131* 131* 124*   POTASSIUM mmol/L 4.2 3.6 3.5   CO2 mmol/L 26.0 28.0 26.0   BUN mg/dL 37* 39* 38*   CREATININE mg/dL 0.82 0.92 0.98   MAGNESIUM mg/dL 1.8  --   --    PHOSPHORUS mg/dL 2.3*  --   --    GLUCOSE mg/dL 97 103* 100*     Estimated Creatinine Clearance: 47.3 mL/min (by C-G formula based on SCr of 0.82 mg/dL).              Lab Results   Component Value Date    LACTATE 1.3 06/05/2019          Images: Admission chest x-ray without consolidation or effusions    I reviewed the patient's results and images.     Impression      Active Hospital Problems    Diagnosis   • **Peripheral arterial disease (CMS/[facility])     · Femoral to femoral bypass with 8 mm PTFE and extended endarterectomy of the left profundus artery to provide blood flow to the left thigh  · Left femoral to left popliteal bypass with 6 mm PTFE graft.         • Former smoker   • COPD (chronic obstructive pulmonary disease) (CMS/[facility])        • A-fib (CMS/[facility])     on coumadin./ sees Dr. Bailey            Plan        Continue fluid restriction  Mobilize  Probably home tomorrow per Dr. Maki   Plan of care and goals reviewed with Multidisciplinary Team and Antibiotic Stewardship rounds   I discussed the patient's findings and my recommendations with patient and nursing staff      .     JUAN Martinez MD  Pulmonary and Critical Care Medicine

## 2019-06-07 NOTE — PROGRESS NOTES
CTS Progress Note       LOS: 3 days   Patient Care Team:  Steff Tellez MD as PCP - General (Internal Medicine)  Barbara Villarreal APRN as Nurse Practitioner (Cardiology)    Chief Complaint: Peripheral arterial disease (CMS/HCC)    Vital Signs:  Temp:  [97 °F (36.1 °C)-98.5 °F (36.9 °C)] 98.4 °F (36.9 °C)  Heart Rate:  [56-75] 66  Resp:  [16-20] 20  BP: ()/(50-96) 132/52    Physical Exam: Feet are warm and viable       Results:   Results from last 7 days   Lab Units 06/07/19  0418   WBC 10*3/mm3 7.54   HEMOGLOBIN g/dL 11.2*   HEMATOCRIT % 34.1   PLATELETS 10*3/mm3 198     Results from last 7 days   Lab Units 06/07/19  0418   SODIUM mmol/L 131*   POTASSIUM mmol/L 4.2   CHLORIDE mmol/L 94*   CO2 mmol/L 26.0   BUN mg/dL 37*   CREATININE mg/dL 0.82   GLUCOSE mg/dL 97   CALCIUM mg/dL 8.8           Imaging Results (last 24 hours)     ** No results found for the last 24 hours. **          Assessment      Peripheral arterial disease (CMS/HCC)    Former smoker    COPD (chronic obstructive pulmonary disease) (CMS/[facility])    A-fib (CMS/[facility])    This patient was able to walk 1 lap around the intensive care unit yesterday without any claudication and her rest pain has resolved.  She is still somewhat unsteady on her feet but we anticipate her discharge home tomorrow.  I gave her instructions about remove leg dressings on Tuesday.  Again, I doubt there is any further revascularization that can be performed on the left leg    Plan   Anticipate discharge home tomorrow.  Patient to remove leg and groin dressings on Tuesday she is on Plavix at home    Please note that portions of this note were completed with a voice recognition program. Efforts were made to edit the dictations, but occasionally words are mistranscribed.    Keyur Maki MD  06/07/19  7:06 AM

## 2019-06-07 NOTE — NURSING NOTE
Pt lost PIV access. Attempted six times to place IV by three different nurse. Four of which were attempted with sonosite.     Discussed with Dr. RAI Martinez. Margarita to not have an IV.

## 2019-06-07 NOTE — PLAN OF CARE
Problem: Patient Care Overview  Goal: Plan of Care Review  Outcome: Ongoing (interventions implemented as appropriate)   06/07/19 9201   OTHER   Outcome Summary Pt up to chair and ambulated with PT. VSS. RA. Possibly going home in am.    Coping/Psychosocial   Plan of Care Reviewed With patient;daughter   Plan of Care Review   Progress no change       Problem: Fall Risk (Adult)  Goal: Absence of Fall  Outcome: Ongoing (interventions implemented as appropriate)      Problem: Skin Injury Risk (Adult)  Goal: Skin Health and Integrity  Outcome: Ongoing (interventions implemented as appropriate)      Problem: Tissue Perfusion, Ineffective Peripheral (Adult)  Goal: Identify Related Risk Factors and Signs and Symptoms  Outcome: Outcome(s) achieved Date Met: 06/07/19    Goal: Adequate Tissue Perfusion  Outcome: Ongoing (interventions implemented as appropriate)

## 2019-06-07 NOTE — PLAN OF CARE
Problem: Patient Care Overview  Goal: Plan of Care Review  Outcome: Ongoing (interventions implemented as appropriate)   06/07/19 6461   OTHER   Outcome Summary vss, no pain, electrolytes replaced, awaiting am labs. on fluid restriction.    Coping/Psychosocial   Plan of Care Reviewed With patient

## 2019-06-07 NOTE — PROGRESS NOTES
Continued Stay Note  [facility]     Patient Name: Kathleen Allen  MRN: [id number]  Today's Date: 6/7/2019    Admit Date: 6/4/2019    Discharge Plan     Row Name 06/07/19 1122       Plan    Plan  Home    Plan Comments  Plan is home tomorrow.  Patient denies any needs.  CM will continue to follow.        Discharge Codes    No documentation.       Expected Discharge Date and Time     Expected Discharge Date Expected Discharge Time    Jun 8, 2019             Kandy Latif RN

## 2019-06-07 NOTE — PROGRESS NOTES
Clinical Nutrition     Multidisciplinary Rounds      Patient Name: Kathleen Allen  Date of Encounter: 06/07/19 10:07 AM  MRN: [id number]  Admission date: 6/4/2019     MAJOR DOUGLAS to continue to follow per protocol.     Current diet: Diet Regular; Cardiac    Intervention:  Follow treatment plan  Care plan reviewed    Follow up:   Per protocol  Plan to tamika Hanson RD  10:07 AM  Time: 10min

## 2019-06-07 NOTE — PLAN OF CARE
Problem: Patient Care Overview  Goal: Plan of Care Review  Outcome: Ongoing (interventions implemented as appropriate)   06/07/19 4685   OTHER   Outcome Summary Pt demonstrates progress toward meeting therapy expectations. Amb 75 ft. using RW, then another 220 ft w/o AD, CGA /left HHA, assuming proper gait mechanics. 1 (2-min) rest break required secondary to mild BLE fatigue. Negotiated 4 steps without difficulty/CGA x 1 with safe body mechanics/use of hand rails. CGA w/ transfers. Pt appropriate for discharge to home with family assist given progress with rehab. Counseled pt on the importance of safety awareness with performing functional activities.   Coping/Psychosocial   Plan of Care Reviewed With patient;daughter   Plan of Care Review   Progress improving

## 2019-06-07 NOTE — PLAN OF CARE
Problem: Patient Care Overview  Goal: Plan of Care Review   06/06/19 6067   OTHER   Outcome Summary Pt up in chair and ambulated in ann x2 with walker. Sodium low on am labs and fluid restrictdion initiated. IV fluids changed to normal sailine and Lasix given. Potassium repalced awaiting redraw. No c/o of pain.        Problem: Fall Risk (Adult)  Goal: Absence of Fall  Outcome: Ongoing (interventions implemented as appropriate)      Problem: Skin Injury Risk (Adult)  Goal: Skin Health and Integrity  Outcome: Ongoing (interventions implemented as appropriate)      Problem: Tissue Perfusion, Ineffective Peripheral (Adult)  Goal: Identify Related Risk Factors and Signs and Symptoms  Outcome: Ongoing (interventions implemented as appropriate)

## 2019-06-07 NOTE — PLAN OF CARE
Problem: Skin Injury Risk (Adult)  Goal: Skin Health and Integrity  Outcome: Ongoing (interventions implemented as appropriate)   06/07/19 0507   Skin Injury Risk (Adult)   Skin Health and Integrity making progress toward outcome

## 2019-06-08 VITALS
OXYGEN SATURATION: 98 % | HEIGHT: 61 IN | DIASTOLIC BLOOD PRESSURE: 55 MMHG | WEIGHT: 125 LBS | RESPIRATION RATE: 20 BRPM | SYSTOLIC BLOOD PRESSURE: 118 MMHG | TEMPERATURE: 98.3 F | HEART RATE: 67 BPM | BODY MASS INDEX: 23.6 KG/M2

## 2019-06-08 PROCEDURE — 99024 POSTOP FOLLOW-UP VISIT: CPT | Performed by: THORACIC SURGERY (CARDIOTHORACIC VASCULAR SURGERY)

## 2019-06-08 RX ORDER — HYDROCODONE BITARTRATE AND ACETAMINOPHEN 7.5; 325 MG/1; MG/1
1 TABLET ORAL EVERY 6 HOURS PRN
Qty: 30 TABLET | Refills: 0 | Status: SHIPPED | OUTPATIENT
Start: 2019-06-08 | End: 2020-01-27

## 2019-06-08 RX ORDER — CLOPIDOGREL BISULFATE 75 MG/1
75 TABLET ORAL DAILY
Qty: 30 TABLET | Refills: 5 | Status: SHIPPED | OUTPATIENT
Start: 2019-06-09 | End: 2019-12-14 | Stop reason: SDUPTHER

## 2019-06-08 RX ADMIN — POTASSIUM & SODIUM PHOSPHATES POWDER PACK 280-160-250 MG 2 PACKET: 280-160-250 PACK at 06:50

## 2019-06-08 RX ADMIN — ATENOLOL 25 MG: 25 TABLET ORAL at 08:57

## 2019-06-08 RX ADMIN — CLOPIDOGREL BISULFATE 75 MG: 75 TABLET ORAL at 08:58

## 2019-06-08 RX ADMIN — LEVOTHYROXINE SODIUM 125 MCG: 25 TABLET ORAL at 06:50

## 2019-06-08 RX ADMIN — ASPIRIN 81 MG CHEWABLE TABLET 81 MG: 81 TABLET CHEWABLE at 06:51

## 2019-06-08 RX ADMIN — HYDROCHLOROTHIAZIDE: 25 TABLET ORAL at 08:57

## 2019-06-08 RX ADMIN — POTASSIUM CHLORIDE 20 MEQ: 750 CAPSULE, EXTENDED RELEASE ORAL at 08:58

## 2019-06-08 NOTE — PLAN OF CARE
Problem: Patient Care Overview  Goal: Plan of Care Review  Outcome: Ongoing (interventions implemented as appropriate)   06/08/19 3051   OTHER   Outcome Summary VSS. No complaints of pain. Pt rested well, no acute changes overnight. Probable d/c today. Will continue to monitor.   Coping/Psychosocial   Plan of Care Reviewed With patient   Plan of Care Review   Progress improving       Problem: Fall Risk (Adult)  Goal: Absence of Fall  Outcome: Ongoing (interventions implemented as appropriate)      Problem: Skin Injury Risk (Adult)  Goal: Skin Health and Integrity  Outcome: Ongoing (interventions implemented as appropriate)      Problem: Tissue Perfusion, Ineffective Peripheral (Adult)  Goal: Adequate Tissue Perfusion  Outcome: Ongoing (interventions implemented as appropriate)

## 2019-06-08 NOTE — PLAN OF CARE
Problem: Patient Care Overview  Goal: Plan of Care Review  Outcome: Ongoing (interventions implemented as appropriate)   06/08/19 1217   OTHER   Outcome Summary Okay for discharge per CT surgery.    Coping/Psychosocial   Plan of Care Reviewed With patient   Plan of Care Review   Progress improving       Problem: Fall Risk (Adult)  Goal: Absence of Fall  Outcome: Outcome(s) achieved Date Met: 06/08/19      Problem: Skin Injury Risk (Adult)  Goal: Skin Health and Integrity  Outcome: Ongoing (interventions implemented as appropriate)      Problem: Tissue Perfusion, Ineffective Peripheral (Adult)  Goal: Adequate Tissue Perfusion  Outcome: Ongoing (interventions implemented as appropriate)

## 2019-06-08 NOTE — PROGRESS NOTES
CTS Progress Note       LOS: 4 days   Patient Care Team:  Steff Tellez MD as PCP - General (Internal Medicine)  Barbara Villarreal APRN as Nurse Practitioner (Cardiology)     Postop day 4 femorofemoral bypass with femoropopliteal    Chief Complaint: Peripheral arterial disease (CMS/[facility])    Vital Signs:  Temp:  [97.9 °F (36.6 °C)-98.5 °F (36.9 °C)] 98.5 °F (36.9 °C)  Heart Rate:  [65-87] 79  Resp:  [14-16] 16  BP: (106-152)/(43-91) 136/74    Physical Exam: Feet are warm and viable       Results:     Results from last 7 days   Lab Units 06/07/19  0418   WBC 10*3/mm3 7.54   HEMOGLOBIN g/dL 11.2*   HEMATOCRIT % 34.1   PLATELETS 10*3/mm3 198     Results from last 7 days   Lab Units 06/07/19  0418   SODIUM mmol/L 131*   POTASSIUM mmol/L 4.2   CHLORIDE mmol/L 94*   CO2 mmol/L 26.0   BUN mg/dL 37*   CREATININE mg/dL 0.82   GLUCOSE mg/dL 97   CALCIUM mg/dL 8.8           Imaging Results (last 24 hours)     ** No results found for the last 24 hours. **          Assessment      Bilateral Peripheral arterial disease with LLE claudication (CMS/[facility])    Former smoker    COPD (chronic obstructive pulmonary disease) (CMS/[facility])    A-fib on chronic coumadin (CMS/HCC)    S/P peripheral artery bypass. Fem fem, and L Fem Pop 6/4/19    This patient was able to walk 1 lap around the intensive care unit yesterday without any claudication and her rest pain has resolved.  She is still somewhat unsteady on her feet but we anticipate her discharge home  I gave her instructions about remove leg dressings on Tuesday.  Again, I doubt there is any further revascularization that can be performed on the left leg    Plan   Possibly home today.  Patient to remove leg and groin dressings on Tuesday she is on Plavix at home    Please note that portions of this note were completed with a voice recognition program. Efforts were made to edit the dictations, but occasionally words are mistranscribed.    Efraín Bruner PA-C  06/08/19  9:02 AM     I  agree with discharge.      Bashir Carrasco MD  CTSurgery  06/08/19   12:33 PM

## 2019-06-09 ENCOUNTER — NURSE TRIAGE (OUTPATIENT)
Dept: CALL CENTER | Facility: HOSPITAL | Age: 84
End: 2019-06-09

## 2019-06-09 ENCOUNTER — READMISSION MANAGEMENT (OUTPATIENT)
Dept: CALL CENTER | Facility: HOSPITAL | Age: 84
End: 2019-06-09

## 2019-06-09 NOTE — OUTREACH NOTE
Prep Survey      Responses   Facility patient discharged from?  [facility]   Is patient eligible?  Yes   Discharge diagnosis   Femoral to femoral bypass and extended endarterectomy    Does the patient have one of the following disease processes/diagnoses(primary or secondary)?  Cardiothoracic surgery   Does the patient have Home health ordered?  No   Is there a DME ordered?  No   Prep survey completed?  Yes          Makayla Francisco RN

## 2019-06-09 NOTE — TELEPHONE ENCOUNTER
States she was discharged yesterday and everything is going well except late last night and today her left ankle and foot is very swollen. States same temperature, color, etc of other extremity. Denies pain, shortness of breath, difficulty breathing, etc. Denies bleeding, pus, etc. Encouraged to elevate when sitting or laying down. She is to call surgeon in AM regarding appt and will notify his nurse at that time. Discussed s/s that require immediate attention.     Reason for Disposition  • [1] Caller has NON-URGENT question AND [2] triager unable to answer question    Additional Information  • Negative: Sounds like a life-threatening emergency to the triager  • Negative: Chest pain  • Negative: Difficulty breathing  • Negative: Surgical incision symptoms and questions  • Negative: [1] Discomfort (pain, burning or stinging) when passing urine AND [2] male  • Negative: [1] Discomfort (pain, burning or stinging) when passing urine AND [2] female  • Negative: Constipation  • Negative: New or worsening leg (calf, thigh) pain  • Negative: New or worsening leg swelling  • Negative: Dizziness is severe, or persists > 24 hours after surgery  • Negative: Pain, redness, swelling, or pus at IV Site  • Negative: Symptoms arising from use of a urinary catheter (Person or Coude)  • Negative: Cast problems or questions  • Negative: Medication question  • Negative: [1] Widespread rash AND [2] bright red, sunburn-like  • Negative: [1] SEVERE headache AND [2] after spinal (epidural) anesthesia  • Negative: [1] Vomiting AND [2] persists > 4 hours  • Negative: [1] Vomiting AND [2] abdomen looks much more swollen than usual  • Negative: [1] Drinking very little AND [2] dehydration suspected (e.g., no urine > 12 hours, very dry mouth, very lightheaded)  • Negative: Patient sounds very sick or weak to the triager  • Negative: Sounds like a serious complication to the triager  • Negative: Fever > 100.5 F (38.1 C)  • Negative: [1] SEVERE  "post-op pain (e.g., excruciating, pain scale 8-10) AND [2] not controlled with pain medications  • Negative: [1] Caller has URGENT question AND [2] triager unable to answer question  • Negative: [1] Headache AND [2] after spinal (epidural) anesthesia AND [3] not severe  • Negative: Fever present > 3 days (72 hours)  • Negative: [1] MILD-MODERATE post-op pain (e.g., pain scale 1-7) AND [2] not controlled with pain medications    Answer Assessment - Initial Assessment Questions  1. SYMPTOM: \"What's the main symptom you're concerned about?\" (e.g., pain, fever, vomiting)      See note  2. ONSET: \"When did ________  start?\"      n/a  3. SURGERY: \"What surgery was performed?\"      n/a  4. DATE of SURGERY: \"When was surgery performed?\"       n/a  5. ANESTHESIA: \" What type of anesthesia did you have?\" (e.g., general, spinal, epidural, local)      n/a  6. PAIN: \"Is there any pain?\" If so, ask: \"How bad is it?\"  (Scale 1-10; or mild, moderate, severe)      n/a  7. FEVER: \"Do you have a fever?\" If so, ask: \"What is your temperature, how was it measured, and when did it start?\"      n/a  8. VOMITING: \"Is there any vomiting?\" If yes, ask: \"How many times?\"      n/a  9. BLEEDING: \"Is there any bleeding?\" If so, ask: \"How much?\" and \"Where?\"      n/a  10. OTHER SYMPTOMS: \"Do you have any other symptoms?\" (e.g., drainage from wound, painful urination, constipation)        n/a    Protocols used: POST-OP SYMPTOMS AND QUESTIONS-ADULT-AH      "

## 2019-06-11 ENCOUNTER — READMISSION MANAGEMENT (OUTPATIENT)
Dept: CALL CENTER | Facility: HOSPITAL | Age: 84
End: 2019-06-11

## 2019-06-11 NOTE — OUTREACH NOTE
CT Surgery Week 1 Survey      Responses   Facility patient discharged from?  [facility]   Does the patient have one of the following disease processes/diagnoses(primary or secondary)?  Cardiothoracic surgery   Is there a successful TCM telephone encounter documented?  No   Week 1 attempt successful?  Yes   Call start time  0909   Call end time  0923   Discharge diagnosis   Femoral to femoral bypass and extended endarterectomy    Meds reviewed with patient/caregiver?  Yes   Is the patient having any side effects they believe may be caused by any medication additions or changes?  No   Does the patient have all medications related to this admission filled (includes all antibiotics, pain medications, cardiac medications, etc.)  Yes   Is the patient taking all medications as directed (includes completed medication regime)?  Yes   Medication comments  Discussed bleeding potential with addition of second anti coagulant   Does the patient have a primary care provider?   Yes   Does the patient have an appointment scheduled with their C/T surgeon?  No   What is preventing the patient from scheduling follow up appointments?  Waiting on return call   Has the patient kept scheduled appointments due by today?  N/A   Comments  Advised to make appt with PCP   Has home health visited the patient within 72 hours of discharge?  N/A   Psychosocial issues?  No   Did the patient receive a copy of their discharge instructions?  Yes   Nursing interventions  Reviewed instructions with patient   What is the patient's perception of their health status since discharge?  Improving   Nursing interventions  Nurse provided patient education   Is the patient/caregiver able to teach back normal signs of recovery?  Nausea and lack of appetite, Pain or discomfort at incisional site   Nursing interventions  Reassured on normal signs of recovery   Is the patient /caregiver able to teach back basic post-op care?  Continue use of incentive spirometry at  least 1 week post discharge, Take showers only when approved by MD-sponge bathe until then, Keep incision areas clean, dry and protected, Lifting as instructed by MD in discharge instructions   Is the patient/caregiver able to teach back signs and symptoms of incisional infection?  Increased redness, swelling or pain at the incisonal site, Incisional warmth, Fever, Increased drainage or bleeding, Pus or odor from incision   Is the patient/caregiver able to teach back steps to recovery at home?  Set small, achievable goals for return to baseline health, Eat a well-balance diet, Rest and rebuild strength, gradually increase activity, Make a list of questions for surgeon's appointment   Additional teach back comments  6/11 States swelling of one foot/ankle, disussed activity, elevating above level of heart, extra diuretic early am tomorrow and call to MD if no improvement or still concerned, leg warm to touch and normal color   Week 1 call completed?  Yes            Carlita Grande, RN

## 2019-06-21 ENCOUNTER — READMISSION MANAGEMENT (OUTPATIENT)
Dept: CALL CENTER | Facility: HOSPITAL | Age: 84
End: 2019-06-21

## 2019-06-21 NOTE — OUTREACH NOTE
CT Surgery Week 2 Survey      Responses   Facility patient discharged from?  [facility]   Does the patient have one of the following disease processes/diagnoses(primary or secondary)?  Cardiothoracic surgery   Week 2 attempt successful?  Yes   Call start time  0201   Call end time  1405   Discharge diagnosis   Femoral to femoral bypass and extended endarterectomy    Meds reviewed with patient/caregiver?  Yes   Is the patient having any side effects they believe may be caused by any medication additions or changes?  No   Does the patient have all medications related to this admission filled (includes all antibiotics, pain medications, cardiac medications, etc.)  Yes   Is the patient taking all medications as directed (includes completed medication regime)?  Yes   Does the patient have a primary care provider?   Yes   Does the patient have an appointment scheduled with their C/T surgeon?  Yes   Has the patient kept scheduled appointments due by today?  Yes   Has home health visited the patient within 72 hours of discharge?  N/A   Psychosocial issues?  No   Did the patient receive a copy of their discharge instructions?  Yes   Nursing interventions  Reviewed instructions with patient   What is the patient's perception of their health status since discharge?  Improving   Nursing interventions  Nurse provided patient education   Is the patient/caregiver able to teach back normal signs of recovery?  Nausea and lack of appetite, Pain or discomfort at incisional site   Nursing interventions  Reassured on normal signs of recovery   Is the patient /caregiver able to teach back basic post-op care?  Continue use of incentive spirometry at least 1 week post discharge, Take showers only when approved by MD-sponge bathe until then, Keep incision areas clean, dry and protected, Lifting as instructed by MD in discharge instructions   Is the patient/caregiver able to teach back signs and symptoms of incisional infection?  Increased  redness, swelling or pain at the incisonal site, Incisional warmth, Fever, Increased drainage or bleeding, Pus or odor from incision   Is the patient/caregiver able to teach back steps to recovery at home?  Set small, achievable goals for return to baseline health, Eat a well-balance diet, Rest and rebuild strength, gradually increase activity, Make a list of questions for surgeon's appointment   Additional teach back comments  Still has LE swelling. MD aware. Advised to keep elevated.   Week 2 call completed?  Yes          Rafael Colon RN

## 2019-06-23 NOTE — DISCHARGE SUMMARY
CTS Discharge Summary    Patient Care Team:  Steff Tellez MD as PCP - General (Internal Medicine)  Barbara Villarreal APRN as Nurse Practitioner (Cardiology)      Date of Admission: 6/4/2019  6:23 AM  Date of Discharge: June 8, 2019    Discharge Diagnosis  Past Medical History:   Diagnosis Date   • A-fib (CMS/[facility])     on coumadin./ sees Dr. 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; chemo and radiation    • Peripheral vascular disease (CMS/[facility])    • PONV (postoperative nausea and vomiting)    • Shortness of breath    • Wears dentures     upper and lower          Bilateral Peripheral arterial disease with LLE claudication (CMS/[facility])    Hypothyroidism    Paroxysmal atrial fibrillation (CMS/[facility])    Essential hypertension    Malaise and fatigue    Atherosclerosis of native artery of both lower extremities with intermittent claudication (CMS/H[facility])     Former smoker    COPD (chronic obstructive pulmonary disease) (CMS/HCC)    A-fib on chronic coumadin (CMS/[facility])    S/P peripheral artery bypass. Fem fem, and L Fem Pop 6/4/19      History of Present IllnessThis patient has a 50-year history of smoking but has not smoked now in a number of years.  She has had pain in both legs when she walks but primarily her pain is in the left calf.  She states that it hurts if she walks as little as 100 feet.  She states she cannot walk up any hill or incline secondary to pain. When she tries to rest, it takes 10 or 15 minutes for the pain to resolve.  She says this has been going on for 3 months and has worsened significantly in the last 30 days.  CT angiogram demonstrates left superficial femoral artery occlusion and significant stenosis in the right external  iliac artery.  She denies slow healing or nonhealing ulcers.               Hospital Course  Patient is a 83 y.o. female underwent internal right femoral artery catheter left femoral artery cath in the aorta and aortogram with runoff  On June 5 she underwent femoral-popliteal bypass of the femoral-femoral bypass by Dr. Maki postop she was placed in ICU  She was making slow steady progress she is only able to do one lap around the intensive care unit she continued claudication or rest pain and unsteady on her feet Dr. Maki I think any further revascularization could be performed on the left leg she was discharged home  Procedures Performed  Procedure(s):  FEMORAL POPLITEAL BYPASS, FEMORAL FEMORAL BYPASS  ANGIOGRAM, FEM FEM BYPASS, LEFT FEM POP, ENDARECTOMY OF LEFT POPLITEAL       Consults:   Consults     No orders found from 5/6/2019 to 6/5/2019.            Discharge Medications     Discharge Medications      New Medications      Instructions Start Date   clopidogrel 75 MG tablet  Commonly known as:  PLAVIX  Notes to patient:  May take next dose tomorrow morning, 06/09/19.   75 mg, Oral, Daily      HYDROcodone-acetaminophen 7.5-325 MG per tablet  Commonly known as:  NORCO  Notes to patient:  May take next dose as needed for pain. Do not exceed 4 tablets in 24 hours.    1 tablet, Oral, Every 6 Hours PRN         Changes to Medications      Instructions Start Date   atenolol 25 MG tablet  Commonly known as:  TENORMIN  What changed:  when to take this  Notes to patient:  May take next dose tomorrow morning, 06/09/19.   25 mg, Oral, Daily      atorvastatin 20 MG tablet  Commonly known as:  LIPITOR  What changed:  when to take this  Notes to patient:  May take next dose tonight before bedtime.    20 mg, Oral, Daily      warfarin 5 MG tablet  Commonly known as:  COUMADIN  What changed:  additional instructions  Notes to patient:  May take next dose tonight before bedtime.   2.5 mg, Oral, Nightly, Stopped per surgeon  for AA with runoff         Continue These Medications      Instructions Start Date   aspirin 81 MG tablet  Notes to patient:  May take next dose tomorrow morning, 06/09/19.   81 mg, Oral, Every Morning      calcium carbonate 600 MG tablet  Commonly known as:  OS-MIGUEL  Notes to patient:  May take next dose tomorrow morning, 06/09/19.   1,200 mg, Oral, Daily      levothyroxine 125 MCG tablet  Commonly known as:  SYNTHROID, LEVOTHROID  Notes to patient:  May take next dose tomorrow morning, 06/09/19.   125 mcg, Oral, Every Morning      losartan-hydrochlorothiazide 100-25 MG per tablet  Commonly known as:  HYZAAR  Notes to patient:  May take next dose tomorrow morning, 06/09/19.   1 tablet, Oral, Every Morning      potassium chloride 10 MEQ CR tablet  Commonly known as:  K-DUR  Notes to patient:  May take next dose tomorrow morning, 06/09/19.   20 mEq, Oral, Daily      torsemide 20 MG tablet  Commonly known as:  DEMADEX  Notes to patient:  Continue taking this medication as prescribed by your doctor.    20 mg, Oral, 3 Times Weekly, Three times weekly and prn      vitamin C 500 MG tablet  Commonly known as:  ASCORBIC ACID  Notes to patient:  May take next dose tomorrow morning, 06/09/19.   1,000 mg, Oral, Every Morning             Discharge Diet: Cardiac    Activity at Discharge:   Activity Instructions     Remove surgical dressings on Tuesday, 06/11/19, per doctor order.                Do not drive while taking narcotics    Follow-up Appointments  Future Appointments   Date Time Provider Department Center   7/1/2019  9:30 AM Juliocesar Barragan PA [facility] None   7/25/2019 10:00 AM Barbara Villarreal APRN [facility] None          Efraín Bruner PA-C  06/23/19  10:24 AM

## 2019-06-28 ENCOUNTER — READMISSION MANAGEMENT (OUTPATIENT)
Dept: CALL CENTER | Facility: HOSPITAL | Age: 84
End: 2019-06-28

## 2019-06-28 NOTE — OUTREACH NOTE
CT Surgery Week 3 Survey      Responses   Facility patient discharged from?  [facility]   Does the patient have one of the following disease processes/diagnoses(primary or secondary)?  Cardiothoracic surgery   Week 3 attempt successful?  Yes   Call start time  1336   Call end time  1337   Discharge diagnosis   Femoral to femoral bypass and extended endarterectomy    Meds reviewed with patient/caregiver?  Yes   Is the patient having any side effects they believe may be caused by any medication additions or changes?  No   Does the patient have all medications related to this admission filled (includes all antibiotics, pain medications, cardiac medications, etc.)  Yes   Is the patient taking all medications as directed (includes completed medication regime)?  Yes   Does the patient have a primary care provider?   Yes   Does the patient have an appointment scheduled with their C/T surgeon?  Yes   Has the patient kept scheduled appointments due by today?  Yes   Psychosocial issues?  No   Did the patient receive a copy of their discharge instructions?  Yes   Nursing interventions  Reviewed instructions with patient, Educated on MyChart   What is the patient's perception of their health status since discharge?  Improving   Nursing interventions  Nurse provided patient education   Is the patient/caregiver able to teach back normal signs of recovery?  Nausea and lack of appetite, Constipation, Depression or irritability, Pain or discomfort at incisional site   Nursing interventions  Reassured on normal signs of recovery   Is the patient /caregiver able to teach back basic post-op care?  Practice 'cough and deep breath', Drive as instructed by MD in discharge instructions, No tub bath, swimming, or hot tub until instructed by MD, Keep incision areas clean, dry and protected   Is the patient/caregiver able to teach back signs and symptoms of incisional infection?  Increased redness, swelling or pain at the incisonal site,  Increased drainage or bleeding, Incisional warmth, Pus or odor from incision, Fever   Is the patient/caregiver able to teach back steps to recovery at home?  Set small, achievable goals for return to baseline health, Rest and rebuild strength, gradually increase activity   Is the patient/caregiver able to teach back the hierarchy of who to call/visit for symptoms/problems? PCP, Specialist, Home health nurse, Urgent Care, ED, 911  Yes   Week 3 call completed?  Yes          Chelsie Damico RN

## 2019-07-01 ENCOUNTER — OFFICE VISIT (OUTPATIENT)
Dept: CARDIAC SURGERY | Facility: CLINIC | Age: 84
End: 2019-07-01

## 2019-07-01 VITALS
DIASTOLIC BLOOD PRESSURE: 88 MMHG | HEART RATE: 61 BPM | OXYGEN SATURATION: 92 % | BODY MASS INDEX: 24.73 KG/M2 | WEIGHT: 131 LBS | SYSTOLIC BLOOD PRESSURE: 166 MMHG | TEMPERATURE: 97.6 F | HEIGHT: 61 IN

## 2019-07-01 DIAGNOSIS — Z98.890 S/P PERIPHERAL ARTERY BYPASS: Primary | ICD-10-CM

## 2019-07-01 PROCEDURE — 99024 POSTOP FOLLOW-UP VISIT: CPT | Performed by: PHYSICIAN ASSISTANT

## 2019-07-01 NOTE — PROGRESS NOTES
07/01/2019  Patient Information  Kathleen Allen                                                                                          [id number]  [place] [id number]   1935  'PCP/Referring Physician'  Steff Tellez MD  [phone]  No ref. provider found    Chief Complaint   Patient presents with   • Post-op     s/p FEM-FEM BYPASS. PT LEFT LEG AND FOOT ARE CONSTANTLY SWOLLEN AFTER SX. STATES IT GETS MORE SWOLLEN AT NIGH TIME BEFORE BED. SHE ALSO STATES HER LEFT LEG CONSTANTLY HURTS.   • Peripheral Vascular Disease       History of Present Illness:  Patient is an 83-year-old  female with history of peripheral vascular disease status post femoral-femoral bypass with left femoral-popliteal bypass performed 6/4/2019 who presents to office for postop follow-up visit.  The patient denies any incisional pain, claudication pain or rest pain.  The patient does have some pain in her left lower extremity, secondary to the increased blood flow and resultant edematous changes.  The patient states that she has not been elevating her legs above her heart while at home or at rest.  The patient had a recent lower extremity venous duplex scheduled by her primary care provider, which showed no evidence of thrombosis.  The patient is otherwise doing well.    Patient Active Problem List   Diagnosis   • Hypothyroidism   • H/O lymph node cancer   • Paroxysmal atrial fibrillation (CMS/HCC)   • Essential hypertension   • Malaise and fatigue   • Shortness of breath   • Atherosclerosis of native artery of both lower extremities with intermittent claudication (CMS/HCC)    • Abnormal ultrasound of lower extremity   • Palpitations   • Peripheral edema   • Bilateral Peripheral arterial disease with LLE claudication (CMS/HCC)   • Former smoker   • COPD (chronic obstructive pulmonary disease) (CMS/HCC)   • A-fib on chronic coumadin (CMS/HCC)   • S/P peripheral artery bypass. Fem fem, and L Fem Pop 6/4/19     Past  Medical History:   Diagnosis Date   • A-fib (CMS/[facility])     on coumadin./ sees Dr. 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; chemo and radiation    • Peripheral vascular disease (CMS/[facility])    • PONV (postoperative nausea and vomiting)    • Shortness of breath    • Wears dentures     upper and lower      Past Surgical History:   Procedure Laterality Date   • ANGIOPLASTY ILIAC ARTERY N/A 6/4/2019    Procedure: ANGIOGRAM, FEM FEM BYPASS, LEFT FEM POP, ENDARECTOMY OF LEFT POPLITEAL;  Surgeon: Keyur Maki MD;  Location:  [email or web address];  Service: Vascular   • AORTAGRAM N/A 5/28/2019    Procedure: ABDOMINAL AORTAGRAM WITH PERIPHERAL RUNOFFS;  Surgeon: Keyur Maki MD;  Location: [facility];  Service: Vascular   • APPENDECTOMY     • CATARACT EXTRACTION, BILATERAL     • COLONOSCOPY  2014   • FEMORAL POPLITEAL BYPASS Left 6/4/2019    Procedure: FEMORAL POPLITEAL BYPASS, FEMORAL FEMORAL BYPASS;  Surgeon: Keyur Maki MD;  Location: [facility];  Service: Vascular   • OTHER SURGICAL HISTORY Left 2008    Left Groin for Lymphoma. chemo/radiation   • POLYPECTOMY     • REPLACEMENT TOTAL KNEE Left 2013       Current Outpatient Medications:   •  aspirin 81 MG tablet, Take 81 mg by mouth Every Morning., Disp: 30 tablet, Rfl: 11  •  atenolol (TENORMIN) 25 MG tablet, Take 1 tablet by mouth Daily. (Patient taking differently: Take 25 mg by mouth Every Morning.), Disp: 30 tablet, Rfl: 11  •  atorvastatin (LIPITOR) 20 MG tablet, Take 1 tablet by mouth Daily. (Patient taking differently: Take 20 mg by mouth Every Night.), Disp: 90 tablet, Rfl: 3  •  calcium carbonate (OS-MIGUEL) 600 MG tablet, Take 1,200 mg by mouth Daily., Disp:  , Rfl:   •  clopidogrel (PLAVIX) 75 MG tablet, Take 1 tablet by mouth Daily., Disp: 30 tablet, Rfl: 5  •  levothyroxine (SYNTHROID, LEVOTHROID) 125 MCG tablet, Take 125 mcg by mouth Every Morning., Disp: , Rfl:   •  losartan-hydrochlorothiazide (HYZAAR) 100-25 MG per tablet, Take 1 tablet by mouth Every Morning., Disp: , Rfl:   •  potassium chloride (K-DUR) 10 MEQ CR tablet, Take 20 mEq by mouth Daily., Disp: , Rfl:   •  torsemide (DEMADEX) 20 MG tablet, Take 20 mg by mouth 3 (Three) Times a Week. Three times weekly and prn, Disp: , Rfl:   •  vitamin C (ASCORBIC ACID) 500 MG tablet, Take 1,000 mg by mouth Every Morning., Disp: , Rfl:   •  warfarin (COUMADIN) 5 MG tablet, Take 0.5 tablets by mouth Every Night. Stopped per surgeon for AA with runoff (Patient taking differently: Take 2.5 mg by mouth Every Night. Stopped per Dr. Adam orders per patient), Disp: , Rfl:   •  HYDROcodone-acetaminophen (NORCO) 7.5-325 MG per tablet, Take 1 tablet by mouth Every 6 (Six) Hours As Needed for Moderate Pain ., Disp: 30 tablet, Rfl: 0  Allergies   Allergen Reactions   • Sulfa Antibiotics Rash     Social History     Socioeconomic History   • Marital status:      Spouse name: Not on file   • Number of children: 5   • Years of education: Not on file   • Highest education level: Not on file   Occupational History   • Occupation: Office and Clerical     Comment: Retired   Tobacco Use   • Smoking status: Former Smoker     Packs/day: 1.00     Years: 50.00     Pack years: 50.00     Types: Cigarettes     Last attempt to quit:      Years since quittin.5   • Smokeless tobacco: Never Used   Substance and Sexual Activity   • Alcohol use: No     Frequency: Never   • Drug use: No   • Sexual activity: Defer   Social History Narrative    Lives in [place].      Family History   Problem Relation Age of Onset   • Heart disease Father    • Heart attack Father      ROS: As per HPI, otherwise negative.  Vitals:    19 0859  "  BP: 166/88   BP Location: Right arm   Patient Position: Sitting   Pulse: 61   Temp: 97.6 °F (36.4 °C)   SpO2: 92%   Weight: 59.4 kg (131 lb)   Height: 154.9 cm (61\")      Physical Exam:  Gen - NAD, pleasant, cooperative  CV - Regular rate and rhythm, no murmur gallop or rub  Pulm - Lungs clear to auscultation without wheeze or rhonchi   GI - Soft, normoactive bowel sounds, non-tender  Ext - 2-3+ left lower extremity edema  Pulses - Bilateral DP/PT 1-2+; left fem-pop graft and fem-fem graft are easily dopplerable   Incision - Healing well, no evidence of incisional dehiscence or cellulitis  Neuro - CN II - XII grossly intact, tongue midline, voice normal    Assessment/Plan:  Patient is an 83-year-old  female with history of peripheral vascular disease status post femoral-femoral bypass with left femoral-popliteal bypass performed 6/4/2019 who presents to office for postop follow-up visit.  The patient has been been doing well since discharge, with the exception of some left lower extremity edema from increased blood flow.  I encouraged patient to elevate her leg while at rest, and to continue taking her diuretic therapy as ordered by her PCP.  I explained to the patient that the edema is from increased blood flow, and would improve with time, especially with elevation of her leg and medical management.  I would like for the patient to follow-up in 3 months to ensure improvement of her current situation.  If she has any questions, concerns or acutely worsening symptoms during the interval she may call our office or present to the nearest emergency department immediately.    Patient Active Problem List   Diagnosis   • Hypothyroidism   • H/O lymph node cancer   • Paroxysmal atrial fibrillation (CMS/HCC)   • Essential hypertension   • Malaise and fatigue   • Shortness of breath   • Atherosclerosis of native artery of both lower extremities with intermittent claudication (CMS/HCC)    • Abnormal ultrasound of " lower extremity   • Palpitations   • Peripheral edema   • Bilateral Peripheral arterial disease with LLE claudication (CMS/HCC)   • Former smoker   • COPD (chronic obstructive pulmonary disease) (CMS/HCC)   • A-fib on chronic coumadin (CMS/HCC)   • S/P peripheral artery bypass. Fem fem, and L Fem Pop 6/4/19

## 2019-07-25 ENCOUNTER — OFFICE VISIT (OUTPATIENT)
Dept: CARDIOLOGY | Facility: CLINIC | Age: 84
End: 2019-07-25

## 2019-07-25 ENCOUNTER — TELEPHONE (OUTPATIENT)
Dept: CARDIOLOGY | Facility: CLINIC | Age: 84
End: 2019-07-25

## 2019-07-25 VITALS
BODY MASS INDEX: 22.96 KG/M2 | SYSTOLIC BLOOD PRESSURE: 127 MMHG | HEIGHT: 61 IN | WEIGHT: 121.6 LBS | OXYGEN SATURATION: 90 % | DIASTOLIC BLOOD PRESSURE: 68 MMHG | HEART RATE: 54 BPM

## 2019-07-25 DIAGNOSIS — I73.9 PERIPHERAL ARTERIAL DISEASE (HCC): ICD-10-CM

## 2019-07-25 DIAGNOSIS — R60.9 PERIPHERAL EDEMA: ICD-10-CM

## 2019-07-25 DIAGNOSIS — R00.2 PALPITATIONS: ICD-10-CM

## 2019-07-25 DIAGNOSIS — I48.0 PAROXYSMAL ATRIAL FIBRILLATION (HCC): ICD-10-CM

## 2019-07-25 DIAGNOSIS — I70.213 ATHEROSCLEROSIS OF NATIVE ARTERY OF BOTH LOWER EXTREMITIES WITH INTERMITTENT CLAUDICATION (HCC): Primary | ICD-10-CM

## 2019-07-25 DIAGNOSIS — I10 ESSENTIAL HYPERTENSION: ICD-10-CM

## 2019-07-25 DIAGNOSIS — R06.02 SHORTNESS OF BREATH: ICD-10-CM

## 2019-07-25 DIAGNOSIS — Z98.890 S/P PERIPHERAL ARTERY BYPASS: ICD-10-CM

## 2019-07-25 PROCEDURE — 99213 OFFICE O/P EST LOW 20 MIN: CPT | Performed by: NURSE PRACTITIONER

## 2019-07-25 NOTE — PATIENT INSTRUCTIONS
Peripheral Vascular Disease  Peripheral vascular disease (PVD) is a disease of the blood vessels. A simple term for PVD is poor circulation. In most cases, PVD narrows the blood vessels that carry blood from your heart to the rest of your body. This can result in a decreased supply of blood to your arms, legs, and internal organs, like your stomach or kidneys. However, it most often affects a person’s lower legs and feet.  There are two types of PVD.  · Organic PVD. This is the more common type. It is caused by damage to the structure of blood vessels.  · Functional PVD. This is caused by conditions that make blood vessels contract and tighten (spasm).    Without treatment, PVD tends to get worse over time.  PVD can also lead to acute limb ischemia. This is when an arm or leg suddenly has trouble getting enough blood. This is a medical emergency.  What are the causes?  Each type of PVD has many different causes. The most common cause of PVD is buildup of a fatty material (plaque) inside your arteries (atherosclerosis). Small amounts of plaque can break off from the walls of the blood vessels and become lodged in a smaller artery. This blocks blood flow and can cause acute limb ischemia.  Other common causes of PVD include:  · Blood clots that form inside of blood vessels.  · Injuries to blood vessels.  · Diseases that cause inflammation of blood vessels or cause blood vessel spasms.  · Health behaviors and health history that increase your risk of developing PVD.    What increases the risk?  You are more likely to develop this condition if:  · You have a family history of PVD.  · You have certain medical conditions, including:  ? High cholesterol.  ? Diabetes.  ? High blood pressure (hypertension).  ? Coronary heart disease.  ? Past problems with blood clots.  ? Past injury, such as burns or a broken bone. These may have damaged blood vessels in your limbs.  ? Buerger disease. This is caused by inflamed blood  vessels in your hands and feet.  ? Some forms of arthritis.  ? Rare birth defects that affect the arteries in your legs.  ? Kidney disease.  · You use tobacco or smoke.  · You do not get enough exercise.  · You are obese.  · You are age 50 or older.    What are the signs or symptoms?  This condition may cause different symptoms. Your symptoms depend on what part of your body is not getting enough blood. Some common signs and symptoms include:  · Cramps in your lower legs. This may be a symptom of poor leg circulation (claudication).  · Pain and weakness in your legs. This happens while you are physically active but goes away when you rest (intermittent claudication).  · Leg pain when at rest.  · Leg numbness, tingling, or weakness.  · Coldness in a leg or foot, especially when compared with the other leg.  · Skin or hair changes. These can include:  ? Hair loss.  ? Shiny skin.  ? Pale or bluish skin.  ? Thick toenails.  · Inability to get or maintain an erection (erectile dysfunction).  · Fatigue.    People with PVD are more likely to develop ulcers and sores on their toes, feet, or legs. These may take longer than normal to heal.  How is this diagnosed?  This condition is diagnosed based on:  · Your signs and symptoms.  · A physical exam and your medical history.  · Other tests to find out what is causing your PVD and to determine its severity. Tests may include:  ? Blood pressure recordings from your arms and legs and measurements of the strength of your pulses (pulse volume recordings).  ? Imaging studies using sound waves to take pictures of the blood flow through your blood vessels (Doppler ultrasound).  ? Injecting a dye into your blood vessels before having imaging studies using:  § X-rays (angiogram or arteriogram).  § Computer-generated X-rays (CT angiogram).  § A powerful electromagnetic field and a computer (magnetic resonance angiogram or MRA).    How is this treated?  Treatment for PVD depends on the  cause of your condition and how severe your symptoms are. It also depends on your age. Underlying causes need to be treated and controlled. These include long-term (chronic) conditions, such as diabetes, high cholesterol, and high blood pressure. Treatment includes:  · Lifestyle changes, such as:  ? Quitting smoking.  ? Exercising regularly.  ? Following a low-fat, low-cholesterol diet.  · Taking medicines, such as:  ? Blood thinners to prevent blood clots.  ? Medicines to improve blood flow.  ? Medicines to improve your blood cholesterol levels.  · Surgical procedures, such as:  ? A procedure that uses an inflated balloon to open a blocked artery and improve blood flow (angioplasty).  ? A procedure to put in a wire mesh tube to keep a blocked artery open (stent implant).  ? Surgery to reroute blood flow around a blocked artery (peripheral bypass surgery).  ? Surgery to remove dead tissue from an infected wound on the affected limb.  ? Amputation. This is surgical removal of the affected limb. It may be necessary in cases of acute limb ischemia where there has been no improvement through medical or surgical treatments.    Follow these instructions at home:  Lifestyle  · Do not use any products that contain nicotine or tobacco, such as cigarettes and e-cigarettes. If you need help quitting, ask your health care provider.  · Lose weight if you are overweight, and maintain a healthy weight as discussed by your health care provider.  · Eat a diet that is low in fat and cholesterol. If you need help, ask your health care provider.  · Exercise regularly. Ask your health care provider to suggest some good activities for you.  General instructions  · Take over-the-counter and prescription medicines only as told by your health care provider.  · Take good care of your feet:  ? Wear comfortable shoes that fit well.  ? Check your feet often for any cuts or sores.  · Keep all follow-up visits as told by your health care  provider. This is important.  Contact a health care provider if:  · You have cramps in your legs while walking.  · You have leg pain when you are at rest.  · You have coldness in a leg or foot.  · Your skin changes.  · You have erectile dysfunction.  · You have cuts or sores on your feet that are not healing.  Get help right away if:  · Your arm or leg turns cold, numb, and blue.  · Your arms or legs become red, warm, swollen, painful, or numb.  · You have chest pain or trouble breathing.  · You suddenly have weakness in your face, arm, or leg.  · You become very confused or lose the ability to speak.  · You suddenly have a very bad headache or lose your vision.  Summary  · Peripheral vascular disease (PVD) is a disease of the blood vessels.  · In most cases, PVD narrows the blood vessels that carry blood from your heart to the rest of your body.  · PVD may cause different symptoms. Your symptoms depend on what part of your body is not getting enough blood.  · Treatment for PVD depends on the cause of your condition and how severe your symptoms are.  This information is not intended to replace advice given to you by your health care provider. Make sure you discuss any questions you have with your health care provider.  Document Released: 01/25/2006 Document Revised: 01/25/2018 Document Reviewed: 01/25/2018  [employer] Interactive Patient Education © 2019 [employer]

## 2019-07-25 NOTE — PROGRESS NOTES
Subjective   Kathleen Allen is a 83 y.o. female     Chief Complaint   Patient presents with   • Follow-up     Here for a 10 week follow up        HPI    Problem List:    1. Atrial Fibrillation by EKG 4/24/19 [id number] 5 Xarelto   1.1 event monitor 5/8-5/21/19-A. fib CVR  2. Hypertension  3. Hyperlipidemia  4. PVD   4.1 BLE arterial US 4/17/19 - occlusion of the common femoral artery on the left with no flow to the left posterior tibial artery.  Mod plaque throughout RLE with blunted flow to popliteal.  Decreased inflow to the right lower extremity indicating central arterial disease   4.2 left femoropopliteal 6/4/19 with Dr. Maki  5. Shortness of breath  5.1 echo 5/6/19-mild LVH, diastolic dysfunction 1/2, moderate left atrial and moderate to severe right atrial enlargement, moderate to severe TR, mild MR, PA pressures in the 60s  6. H/O lymph node left groin 2009 3 chemo tx's and 30 rad tx's  7.  Stress test 5/6/19-no scintographic evidence of ischemia, post-rest EF 71%    Patient is an 83-year-old female who presents today for follow-up status post left femoropopliteal with Dr. Maki with her daughter at her side.  She denies any chest pain, pressure, palpitations, fluttering, dizziness, presyncope, syncope, orthopnea or PND.  She had some significant swelling after surgery.  She has lost 10 pounds since that got her on the diuretics.  She is taking potassium at this time.  They ended up putting her on Coumadin.  She denies any signs of bleeding or cerebral ischemic events.  Denies any shortness of breath with activity.  She said that Dr. Maki did tell her if this did not take she would end up with amputation.  Patient's left foot is discolored from ankle down.  She has about 1+ edema in that area and the rest of her leg looks really good.  Her incision site Is completely healed.     We went over stress, echo and event.     Current Outpatient Medications   Medication Sig Dispense Refill   •  aspirin 81 MG tablet Take 81 mg by mouth Every Morning. 30 tablet 11   • atenolol (TENORMIN) 25 MG tablet Take 1 tablet by mouth Daily. (Patient taking differently: Take 25 mg by mouth Every Morning.) 30 tablet 11   • atorvastatin (LIPITOR) 20 MG tablet Take 1 tablet by mouth Daily. (Patient taking differently: Take 20 mg by mouth Every Night.) 90 tablet 3   • calcium carbonate (OS-MIGUEL) 600 MG tablet Take 1,200 mg by mouth Daily.     • clopidogrel (PLAVIX) 75 MG tablet Take 1 tablet by mouth Daily. 30 tablet 5   • HYDROcodone-acetaminophen (NORCO) 7.5-325 MG per tablet Take 1 tablet by mouth Every 6 (Six) Hours As Needed for Moderate Pain . 30 tablet 0   • levothyroxine (SYNTHROID, LEVOTHROID) 125 MCG tablet Take 125 mcg by mouth Every Morning.     • losartan-hydrochlorothiazide (HYZAAR) 100-25 MG per tablet Take 1 tablet by mouth Every Morning.     • potassium chloride (K-DUR) 10 MEQ CR tablet Take 20 mEq by mouth Daily.     • torsemide (DEMADEX) 20 MG tablet Take 20 mg by mouth 3 (Three) Times a Week. Three times weekly and prn     • vitamin C (ASCORBIC ACID) 500 MG tablet Take 1,000 mg by mouth Every Morning.     • warfarin (COUMADIN) 5 MG tablet Take 0.5 tablets by mouth Every Night. Stopped per surgeon for AA with runoff (Patient taking differently: Take 2.5 mg by mouth Every Night. Stopped per Dr. Adam orders per patient)       No current facility-administered medications for this visit.        ALLERGIES    Sulfa antibiotics    Past Medical History:   Diagnosis Date   • A-fib (CMS/HCC)     on coumadin./ sees Dr. 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; chemo and radiation    • Peripheral vascular disease (CMS/HCC)    • PONV  (postoperative nausea and vomiting)    • Shortness of breath    • Wears dentures     upper and lower        Social History     Socioeconomic History   • Marital status:      Spouse name: Not on file   • Number of children: 5   • Years of education: Not on file   • Highest education level: Not on file   Occupational History   • Occupation: Office and Clerical     Comment: Retired   Tobacco Use   • Smoking status: Former Smoker     Packs/day: 1.00     Years: 50.00     Pack years: 50.00     Types: Cigarettes     Last attempt to quit:      Years since quittin.5   • Smokeless tobacco: Never Used   Substance and Sexual Activity   • Alcohol use: No     Frequency: Never   • Drug use: No   • Sexual activity: Defer   Social History Narrative    Lives in [place].        Family History   Problem Relation Age of Onset   • Heart disease Father    • Heart attack Father        Review of Systems   Constitutional: Positive for fatigue (less energy ). Negative for chills and fever.   HENT: Positive for rhinorrhea. Negative for congestion and sore throat.    Eyes: Positive for visual disturbance (glasses daily ).   Respiratory: Negative for chest tightness and shortness of breath.    Cardiovascular: Positive for leg swelling (Some LE edema since vein surgery. Has improved somewhat in the last week ). Negative for chest pain and palpitations.   Gastrointestinal: Negative for abdominal pain, blood in stool, nausea and vomiting.   Endocrine: Positive for cold intolerance (always cold ). Negative for heat intolerance.   Genitourinary: Positive for frequency (on diuretics ). Negative for dysuria, hematuria and urgency.   Musculoskeletal: Positive for arthralgias (some pain between shoulder blades ). Negative for back pain.   Skin: Negative for rash and wound.   Allergic/Immunologic: Negative for environmental allergies and food allergies.   Neurological: Negative for dizziness, weakness and light-headedness.  "  Hematological: Bruises/bleeds easily (Bruises easily ).   Psychiatric/Behavioral: Negative for sleep disturbance (Denies waking with smothering or SOA).       Objective   /68 (BP Location: Left arm, Patient Position: Sitting)   Pulse 54   Ht 154.9 cm (61\")   Wt 55.2 kg (121 lb 9.6 oz)   SpO2 90%   BMI 22.98 kg/m²   Vitals:    07/25/19 1035   BP: 127/68   BP Location: Left arm   Patient Position: Sitting   Pulse: 54   SpO2: 90%   Weight: 55.2 kg (121 lb 9.6 oz)   Height: 154.9 cm (61\")      Lab Results (most recent)     None        Physical Exam   Constitutional: She is oriented to person, place, and time. Vital signs are normal. She appears well-developed and well-nourished. She is active and cooperative.   HENT:   Head: Normocephalic.   Mouth/Throat: She has dentures (upper and lower).   Eyes: Lids are normal.   Blood vessel popped in left eye    Neck: Normal carotid pulses, no hepatojugular reflux and no JVD present. Carotid bruit is not present.   Cardiovascular: Normal heart sounds. An irregularly irregular rhythm present. Bradycardia present.   Pulses:       Radial pulses are 2+ on the right side, and 2+ on the left side.        Dorsalis pedis pulses are 1+ on the right side, and 1+ on the left side.        Posterior tibial pulses are 1+ on the right side, and 1+ on the left side.   1+ edema LLE; no edema RLE. Varicose veins BLE.   Pulmonary/Chest: Effort normal and breath sounds normal.   Abdominal: Normal appearance and bowel sounds are normal.   Neurological: She is alert and oriented to person, place, and time.   Skin: Skin is warm, dry and intact.   Healed incision inside of left knee    Psychiatric: She has a normal mood and affect. Her speech is normal and behavior is normal. Judgment and thought content normal. Cognition and memory are normal.       Procedure   Procedures         Assessment/Plan      Diagnosis Plan   1. Atherosclerosis of native artery of both lower extremities with " intermittent claudication (CMS/HCC)      2. Essential hypertension     3. Palpitations     4. Paroxysmal atrial fibrillation (CMS/HCC)     5. Bilateral Peripheral arterial disease with LLE claudication (CMS/HCC)     6. Shortness of breath     7. Peripheral edema     8. S/P peripheral artery bypass. Fem fem, and L Fem Pop 6/4/19         Return in about 6 months (around 1/25/2020).    PVD-patient is on aspirin and statin.  Hypertension-patient is doing well on atenolol and Hyzaar.  Palpitations/paroxysmal A. fib-patient is on beta-blocker and coumadin.  Patient is on ASA, Plavix and Coumadin.  I will contact vascular surgery make sure it is okay to stop the aspirin leave her on just the Plavix and the Coumadin.  Shortness of breath-stable.  Peripheral edema-doing much better with the torsemide.  She will continue her medication regimen for now.  She sees vascular again in 3 months and therefore I will have her follow-up in 6 months or sooner if any changes.     Patient's Body mass index is 22.98 kg/m². BMI is within normal parameters. No follow-up required..      Electronically signed by:

## 2019-07-25 NOTE — TELEPHONE ENCOUNTER
Patient states foot has swelling and discoloration since surgery.  Discoloration is localized in ankle area whereas swelling is foot and ankle. She thinks this is surgery related.  States swelling and discoloration started 2 days after surgery.  Swelling has improved.  Verbalizes she can tell a difference each day.  She elevates her foot as much as possible.           ----- Message from IVONNE Newby sent at 7/25/2019 12:36 PM EDT -----  Can you call patient back and ask her if her left foot has been discolored since surgery or if that was new.  I think she told me but I forgot.  If she says it is new then I may order additional testing.     Thanks!

## 2019-07-29 ENCOUNTER — TELEPHONE (OUTPATIENT)
Dept: CARDIOLOGY | Facility: CLINIC | Age: 84
End: 2019-07-29

## 2019-07-29 DIAGNOSIS — I10 ESSENTIAL HYPERTENSION: ICD-10-CM

## 2019-07-29 DIAGNOSIS — I70.202 FEMORAL ARTERY STENOSIS, LEFT (HCC): ICD-10-CM

## 2019-07-29 DIAGNOSIS — I73.9 CLAUDICATION (HCC): Primary | ICD-10-CM

## 2019-07-29 NOTE — TELEPHONE ENCOUNTER
----- Message from IVONNE Newby sent at 7/29/2019  7:05 AM EDT -----  Have patient stop ASA and continue Coumadin and Plavix.  She needs a CTA of abd with run off due to discoloration of foot s/p Fem pop, left.  She needs ASAP/Stat.  She will need a BMP prior.   ----- Message -----  From: Juliocesar Barragan PA  Sent: 7/26/2019  11:22 AM  To: IVONNE Newby    OK to stop ASA. No it was not.     ----- Message -----  From: Barbara Villarreal APRN  Sent: 7/25/2019   1:29 PM  To: GIFTY Garcia    Patient recent Fem-Pop with Dr. Maki.  Chronic Afib.  She is on ASA, Plavix and Coumadin.  Is it ok to stop her ASA and leave on Plavix and Coumadin.    Also when you saw her last was her LLE purple from ankle down?

## 2019-08-01 ENCOUNTER — TELEPHONE (OUTPATIENT)
Dept: CARDIOLOGY | Facility: CLINIC | Age: 84
End: 2019-08-01

## 2019-08-01 NOTE — TELEPHONE ENCOUNTER
"Called [facility] scheduling at [phone], spoke w/ Javier, she stated pt cx w/ no reason given.           Called and spoke w/ pt, I stated that we were informed she R/S her STAT testing and asked why. She stated she just wasn't able to make it. I advised her of the importance of the testing, she stated:\"I just don't know.\"  Advised pt that Barbara feels this needs to be addressed STAT and not wait until 8/7/19. Pt stated: \"I have a lung appointment at 11am tomorrow, you'd need to work around that.\" I advised her that we would do our best and we'd call back.         Called [facility] and spoke w/ Jeri in scheduling, she scheduled pt for appt at 1pm, arrive at 12:30pm, NPO 4hr.        Informed pt that she needs to go to [facility] tomorrow at 12:30pm for her imaging, stated to be NPO x4hr, she stated she would be there and denied having any questions.     "

## 2019-08-01 NOTE — TELEPHONE ENCOUNTER
----- Message from IVONNE Newby sent at 8/1/2019  7:54 AM EDT -----  Can you call [facility] and find out why CT was rescheduled.  If it was hospital's doing advise this is a stat and needs to be done today or tomorrow.  If patient's doing call and find out why patient rescheduled and can she get done any sooner than 8/7/19, which is what it appears to be scheduled for.

## 2019-08-08 ENCOUNTER — TELEPHONE (OUTPATIENT)
Dept: CARDIOLOGY | Facility: CLINIC | Age: 84
End: 2019-08-08

## 2019-08-08 NOTE — TELEPHONE ENCOUNTER
----- Message from Jeri Alcala sent at 8/8/2019 10:28 AM EDT -----  Pt left vm that she was unable to have her CT scan.

## 2019-08-08 NOTE — TELEPHONE ENCOUNTER
"Per chart review, pt's CT was ordered as STAT and she R/S. I called her on 8/1/19 and told her it needed to be done and got her R/S for 8/2/19.         Pt states that she had the lab work done, it took three people to get blood and that blood started \"pouring\" all over her blouse and clothes. States she decided against the scan and refuses to do it. Pt is aware that she's going against medical advice.   "

## 2019-11-12 ENCOUNTER — TELEPHONE (OUTPATIENT)
Dept: CARDIAC SURGERY | Facility: CLINIC | Age: 84
End: 2019-11-12

## 2019-12-02 ENCOUNTER — OFFICE VISIT (OUTPATIENT)
Dept: CARDIAC SURGERY | Facility: CLINIC | Age: 84
End: 2019-12-02

## 2019-12-02 VITALS
SYSTOLIC BLOOD PRESSURE: 140 MMHG | HEIGHT: 61 IN | OXYGEN SATURATION: 93 % | DIASTOLIC BLOOD PRESSURE: 80 MMHG | WEIGHT: 122 LBS | HEART RATE: 71 BPM | BODY MASS INDEX: 23.03 KG/M2

## 2019-12-02 DIAGNOSIS — Z98.890 S/P PERIPHERAL ARTERY BYPASS: Primary | ICD-10-CM

## 2019-12-02 DIAGNOSIS — I73.9 PVD (PERIPHERAL VASCULAR DISEASE) (HCC): ICD-10-CM

## 2019-12-02 DIAGNOSIS — Z87.891 FORMER SMOKER: ICD-10-CM

## 2019-12-02 PROCEDURE — 99213 OFFICE O/P EST LOW 20 MIN: CPT | Performed by: NURSE PRACTITIONER

## 2019-12-02 NOTE — PROGRESS NOTES
12/02/2019  Patient Information  Kathleen Allen                                                                                          [id number]  [place] [id number]   1935  'PCP/Referring Physician'  Steff Tellez MD  [phone]  No ref. provider found    Chief Complaint   Patient presents with   • Follow-up     3 MO FU for PVD-Still Has Some Left Leg Fatigue       History of Present Illness:      Patient Active Problem List   Diagnosis   • Hypothyroidism   • H/O lymph node cancer   • Paroxysmal atrial fibrillation (CMS/HCC)   • Essential hypertension   • Malaise and fatigue   • Shortness of breath   • Atherosclerosis of native artery of both lower extremities with intermittent claudication (CMS/HCC)    • Abnormal ultrasound of lower extremity   • Palpitations   • Peripheral edema   • Bilateral Peripheral arterial disease with LLE claudication (CMS/HCC)   • Former smoker   • COPD (chronic obstructive pulmonary disease) (CMS/HCC)   • A-fib on chronic coumadin (CMS/HCC)   • S/P peripheral artery bypass. Fem fem, and L Fem Pop 6/4/19     Past Medical History:   Diagnosis Date   • A-fib (CMS/HCC)     on coumadin./ sees Dr. 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; chemo and radiation    • Peripheral vascular disease (CMS/HCC)    • PONV (postoperative nausea and vomiting)    • Shortness of breath    • Wears dentures     upper and lower      Past Surgical History:   Procedure Laterality Date   • ANGIOPLASTY ILIAC ARTERY N/A 6/4/2019    Procedure: ANGIOGRAM, FEM FEM BYPASS, LEFT FEM POP, ENDARECTOMY OF LEFT POPLITEAL;  Surgeon: Keyur Maki MD;  Location: [place] [id number];  Service: Vascular   • AORTAGRAM N/A  5/28/2019    Procedure: ABDOMINAL AORTAGRAM WITH PERIPHERAL RUNOFFS;  Surgeon: Keyur Maki MD;  Location:  [facility];  Service: Vascular   • APPENDECTOMY     • CATARACT EXTRACTION, BILATERAL     • COLONOSCOPY  2014   • FEMORAL POPLITEAL BYPASS Left 6/4/2019    Procedure: FEMORAL POPLITEAL BYPASS, FEMORAL FEMORAL BYPASS;  Surgeon: Keyur Maki MD;  Location:  [facility];  Service: Vascular   • OTHER SURGICAL HISTORY Left 2008    Left Groin for Lymphoma. chemo/radiation   • POLYPECTOMY     • REPLACEMENT TOTAL KNEE Left 2013       Current Outpatient Medications:   •  atenolol (TENORMIN) 25 MG tablet, Take 1 tablet by mouth Daily. (Patient taking differently: Take 25 mg by mouth Every Morning.), Disp: 30 tablet, Rfl: 11  •  atorvastatin (LIPITOR) 20 MG tablet, Take 1 tablet by mouth Daily. (Patient taking differently: Take 20 mg by mouth Every Night.), Disp: 90 tablet, Rfl: 3  •  calcium carbonate (OS-MIGUEL) 600 MG tablet, Take 1,200 mg by mouth Daily., Disp: , Rfl:   •  clopidogrel (PLAVIX) 75 MG tablet, Take 1 tablet by mouth Daily., Disp: 30 tablet, Rfl: 5  •  levothyroxine (SYNTHROID, LEVOTHROID) 125 MCG tablet, Take 125 mcg by mouth Every Morning., Disp: , Rfl:   •  losartan-hydrochlorothiazide (HYZAAR) 100-25 MG per tablet, Take 1 tablet by mouth Every Morning., Disp: , Rfl:   •  potassium chloride (K-DUR) 10 MEQ CR tablet, Take 20 mEq by mouth Daily., Disp: , Rfl:   •  torsemide (DEMADEX) 20 MG tablet, Take 20 mg by mouth 3 (Three) Times a Week. Three times weekly and prn, Disp: , Rfl:   •  vitamin C (ASCORBIC ACID) 500 MG tablet, Take 1,000 mg by mouth Every Morning., Disp: , Rfl:   •  aspirin 81 MG tablet, Take 81 mg by mouth Every Morning., Disp: 30 tablet, Rfl: 11  •  HYDROcodone-acetaminophen (NORCO) 7.5-325 MG per tablet, Take 1 tablet by mouth Every 6 (Six) Hours As Needed for Moderate Pain ., Disp: 30 tablet, Rfl: 0  •  warfarin (COUMADIN) 5 MG tablet, Take 0.5 tablets by  mouth Every Night. Stopped per surgeon for AA with runoff (Patient taking differently: Take 2.5 mg by mouth Every Night. Stopped per Dr. Adam orders per patient), Disp: , Rfl:   Allergies   Allergen Reactions   • Sulfa Antibiotics Rash     Social History     Socioeconomic History   • Marital status:      Spouse name: Not on file   • Number of children: 5   • Years of education: Not on file   • Highest education level: Not on file   Occupational History   • Occupation: Office and Clerical     Comment: Retired   Tobacco Use   • Smoking status: Former Smoker     Packs/day: 1.00     Years: 50.00     Pack years: 50.00     Types: Cigarettes     Last attempt to quit:      Years since quittin.9   • Smokeless tobacco: Never Used   Substance and Sexual Activity   • Alcohol use: No     Frequency: Never   • Drug use: No   • Sexual activity: Defer   Social History Narrative    Lives in [place].      Family History   Problem Relation Age of Onset   • Heart disease Father    • Heart attack Father      Review of Systems   Constitution: Positive for night sweats. Negative for chills, fever, malaise/fatigue and weight loss.   HENT: Negative for hearing loss, odynophagia and sore throat.    Cardiovascular: Positive for claudication (left leg) and dyspnea on exertion. Negative for chest pain, leg swelling, orthopnea and palpitations.   Respiratory: Positive for shortness of breath. Negative for cough and hemoptysis.    Endocrine: Negative for cold intolerance, heat intolerance, polydipsia, polyphagia and polyuria.   Hematologic/Lymphatic: Bruises/bleeds easily.   Skin: Negative for itching and rash.   Musculoskeletal: Positive for arthritis. Negative for joint pain, joint swelling and myalgias.   Gastrointestinal: Negative for abdominal pain, constipation, diarrhea, hematemesis, hematochezia, melena, nausea and vomiting.   Genitourinary: Negative for dysuria, frequency and hematuria.   Neurological: Negative for  "focal weakness, headaches, numbness and seizures.   Psychiatric/Behavioral: Negative for suicidal ideas.   All other systems reviewed and are negative.    Vitals:    12/02/19 0755   BP: 140/80   BP Location: Left arm   Patient Position: Sitting   Pulse: 71   SpO2: 93%   Weight: 55.3 kg (122 lb)   Height: 154.9 cm (61\")      Physical Exam    Labs/Imaging:    Assessment/Plan:    Patient Active Problem List   Diagnosis   • Hypothyroidism   • H/O lymph node cancer   • Paroxysmal atrial fibrillation (CMS/HCC)   • Essential hypertension   • Malaise and fatigue   • Shortness of breath   • Atherosclerosis of native artery of both lower extremities with intermittent claudication (CMS/HCC)    • Abnormal ultrasound of lower extremity   • Palpitations   • Peripheral edema   • Bilateral Peripheral arterial disease with LLE claudication (CMS/HCC)   • Former smoker   • COPD (chronic obstructive pulmonary disease) (CMS/HCC)   • A-fib on chronic coumadin (CMS/HCC)   • S/P peripheral artery bypass. Fem fem, and L Fem Pop 6/4/19                      "

## 2019-12-02 NOTE — PROGRESS NOTES
[facility] Cardiothoracic Surgery Follow-Up Note    Name:  Kathleen Allen  MRN Number:  [id number]  Date of Encounter:  12/02/2019    Referred By:  No ref. provider found  PCP:  Steff Tellez MD    Chief Complaint:    Chief Complaint   Patient presents with   • Follow-up     3 MO FU for PVD-Still Has Some Left Leg Fatigue       History of Present Illness:    Ms. Kathleen Allen is a pleasant 84 y.o. female with a history of peripheral vascular disease status post femoral-femoral bypass with left femoral-popliteal bypass performed on 6/4/2019 who returns the office today for follow-up visit.  The patient denies any incisional pain, claudication pain, rest pain or difficulty with ambulation.  The swelling she was having in her left lower extremity has grossly resolved.  Otherwise, the patient is doing well.    Review of Systems:  Review of Systems   Constitution: Positive for night sweats. Negative for chills, fever, malaise/fatigue and weight loss.   HENT: Negative for hearing loss, odynophagia and sore throat.    Cardiovascular: Positive for claudication (left leg) and dyspnea on exertion. Negative for chest pain, leg swelling, orthopnea and palpitations.   Respiratory: Positive for shortness of breath. Negative for cough and hemoptysis.    Endocrine: Negative for cold intolerance, heat intolerance, polydipsia, polyphagia and polyuria.   Hematologic/Lymphatic: Bruises/bleeds easily.   Skin: Negative for itching and rash.   Musculoskeletal: Positive for arthritis. Negative for joint pain, joint swelling and myalgias.   Gastrointestinal: Negative for abdominal pain, constipation, diarrhea, hematemesis, hematochezia, melena, nausea and vomiting.   Genitourinary: Negative for dysuria, frequency and hematuria.   Neurological: Negative for focal weakness, headaches, numbness and seizures.   Psychiatric/Behavioral: Negative for suicidal ideas.   All other systems reviewed and are negative.      Past  Medical History:    Past Medical History:   Diagnosis Date   • A-fib (CMS/HCC)     on coumadin./ sees Dr. 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; chemo and radiation    • Peripheral vascular disease (CMS/HCC)    • PONV (postoperative nausea and vomiting)    • Shortness of breath    • Wears dentures     upper and lower        Past Surgical History:    Past Surgical History:   Procedure Laterality Date   • ANGIOPLASTY ILIAC ARTERY N/A 6/4/2019    Procedure: ANGIOGRAM, FEM FEM BYPASS, LEFT FEM POP, ENDARECTOMY OF LEFT POPLITEAL;  Surgeon: Keyur Maki MD;  Location: [facility];  Service: Vascular   • AORTAGRAM N/A 5/28/2019    Procedure: ABDOMINAL AORTAGRAM WITH PERIPHERAL RUNOFFS;  Surgeon: Keyur Maki MD;  Location: [facility];  Service: Vascular   • APPENDECTOMY     • CATARACT EXTRACTION, BILATERAL     • COLONOSCOPY  2014   • FEMORAL POPLITEAL BYPASS Left 6/4/2019    Procedure: FEMORAL POPLITEAL BYPASS, FEMORAL FEMORAL BYPASS;  Surgeon: Keyur Maki MD;  Location: [facility];  Service: Vascular   • OTHER SURGICAL HISTORY Left 2008    Left Groin for Lymphoma. chemo/radiation   • POLYPECTOMY     • REPLACEMENT TOTAL KNEE Left 2013       Patient Active Problem List   Diagnosis   • Hypothyroidism   • H/O lymph node cancer   • Paroxysmal atrial fibrillation (CMS/HCC)   • Essential hypertension   • Malaise and fatigue   • Shortness of breath   • Atherosclerosis of native artery of both lower extremities with intermittent claudication (CMS/HCC)    • Abnormal ultrasound of lower extremity   • Palpitations   • Peripheral edema   • Bilateral Peripheral arterial disease with LLE claudication (CMS/HCC)    • Former smoker   • COPD (chronic obstructive pulmonary disease) (CMS/HCC)   • A-fib on chronic coumadin (CMS/HCC)   • S/P peripheral artery bypass. Fem fem, and L Fem Pop 19     Social History     Tobacco Use   • Smoking status: Former Smoker     Packs/day: 1.00     Years: 50.00     Pack years: 50.00     Types: Cigarettes     Last attempt to quit:      Years since quittin.9   • Smokeless tobacco: Never Used   Substance Use Topics   • Alcohol use: No     Frequency: Never   • Drug use: No     Family History   Problem Relation Age of Onset   • Heart disease Father    • Heart attack Father        Medications:      Current Outpatient Medications:   •  atenolol (TENORMIN) 25 MG tablet, Take 1 tablet by mouth Daily. (Patient taking differently: Take 25 mg by mouth Every Morning.), Disp: 30 tablet, Rfl: 11  •  atorvastatin (LIPITOR) 20 MG tablet, Take 1 tablet by mouth Daily. (Patient taking differently: Take 20 mg by mouth Every Night.), Disp: 90 tablet, Rfl: 3  •  calcium carbonate (OS-MIGUEL) 600 MG tablet, Take 1,200 mg by mouth Daily., Disp: , Rfl:   •  clopidogrel (PLAVIX) 75 MG tablet, Take 1 tablet by mouth Daily., Disp: 30 tablet, Rfl: 5  •  levothyroxine (SYNTHROID, LEVOTHROID) 125 MCG tablet, Take 125 mcg by mouth Every Morning., Disp: , Rfl:   •  losartan-hydrochlorothiazide (HYZAAR) 100-25 MG per tablet, Take 1 tablet by mouth Every Morning., Disp: , Rfl:   •  potassium chloride (K-DUR) 10 MEQ CR tablet, Take 20 mEq by mouth Daily., Disp: , Rfl:   •  torsemide (DEMADEX) 20 MG tablet, Take 20 mg by mouth 3 (Three) Times a Week. Three times weekly and prn, Disp: , Rfl:   •  vitamin C (ASCORBIC ACID) 500 MG tablet, Take 1,000 mg by mouth Every Morning., Disp: , Rfl:   •  aspirin 81 MG tablet, Take 81 mg by mouth Every Morning., Disp: 30 tablet, Rfl: 11  •  HYDROcodone-acetaminophen (NORCO) 7.5-325 MG per tablet, Take 1 tablet by mouth Every 6 (Six) Hours As Needed for Moderate Pain ., Disp: 30 tablet,  "Rfl: 0  •  warfarin (COUMADIN) 5 MG tablet, Take 0.5 tablets by mouth Every Night. Stopped per surgeon for AA with runoff (Patient taking differently: Take 2.5 mg by mouth Every Night. Stopped per Dr. Adam orders per patient), Disp: , Rfl:     Allergies:  Allergies   Allergen Reactions   • Sulfa Antibiotics Rash       Physical Exam:  Vital Signs:    Vitals:    12/02/19 0755   BP: 140/80   BP Location: Left arm   Patient Position: Sitting   Pulse: 71   SpO2: 93%   Weight: 55.3 kg (122 lb)   Height: 154.9 cm (61\")       Physical Exam   Gen- NAD, pleasant, cooperative  CV- Regular rate and rhythm, no murmur, gallop or rub  Pulm- Clear to auscultation bilateral without wheeze or rhonchi  GI- Soft, normoactive bowel sounds, non-tender  Ext- Without edema,   Neuro- CN II- XII grossly intact, tongue midline, voice normal.    Labs/Imaging:  No imaging was obtained in the office today.    Assessment / Plan:  Ms. Kathleen Allen is a pleasant 84 y.o. female with a history of peripheral vascular disease status post femoral-femoral bypass with left femoral-popliteal bypass performed on 6/4/2019 who returns the office today for follow-up visit.  The patient has been doing well since the time of her last office visit.  Upon physical exam, the patient's feet are warm and viable, her lower extremity pulses are easily dopplerable, as is the patient's femoral to femoral bypass graft and femoral to popliteal bypass graft.  The patient will need to follow-up in our office in 1 year for continued evaluation.  If she should develop any acutely worsening symptoms during the interval, she may call our office or present to the nearest emergency department.    Follow Up:  1 year    Please note, this document was produced using voice recognition software.    IVONNE Villalobos  [facility] Cardiothoracic Surgery  "

## 2019-12-16 RX ORDER — CLOPIDOGREL BISULFATE 75 MG/1
TABLET ORAL
Qty: 90 TABLET | Refills: 0 | Status: SHIPPED | OUTPATIENT
Start: 2019-12-16 | End: 2020-03-16

## 2020-01-27 ENCOUNTER — OFFICE VISIT (OUTPATIENT)
Dept: CARDIOLOGY | Facility: CLINIC | Age: 85
End: 2020-01-27

## 2020-01-27 VITALS
DIASTOLIC BLOOD PRESSURE: 64 MMHG | HEART RATE: 59 BPM | BODY MASS INDEX: 22.84 KG/M2 | HEIGHT: 61 IN | OXYGEN SATURATION: 94 % | WEIGHT: 121 LBS | SYSTOLIC BLOOD PRESSURE: 112 MMHG

## 2020-01-27 DIAGNOSIS — I70.213 ATHEROSCLEROSIS OF NATIVE ARTERY OF BOTH LOWER EXTREMITIES WITH INTERMITTENT CLAUDICATION (HCC): Primary | ICD-10-CM

## 2020-01-27 DIAGNOSIS — I10 ESSENTIAL HYPERTENSION: ICD-10-CM

## 2020-01-27 DIAGNOSIS — R06.02 SHORTNESS OF BREATH: ICD-10-CM

## 2020-01-27 DIAGNOSIS — I73.9 PVD (PERIPHERAL VASCULAR DISEASE) (HCC): ICD-10-CM

## 2020-01-27 DIAGNOSIS — R60.9 PERIPHERAL EDEMA: ICD-10-CM

## 2020-01-27 DIAGNOSIS — I73.9 PERIPHERAL ARTERIAL DISEASE (HCC): ICD-10-CM

## 2020-01-27 DIAGNOSIS — I48.0 PAROXYSMAL ATRIAL FIBRILLATION (HCC): ICD-10-CM

## 2020-01-27 DIAGNOSIS — Z98.890 S/P PERIPHERAL ARTERY BYPASS: ICD-10-CM

## 2020-01-27 PROCEDURE — 99214 OFFICE O/P EST MOD 30 MIN: CPT | Performed by: NURSE PRACTITIONER

## 2020-01-27 RX ORDER — ARFORMOTEROL TARTRATE 15 UG/2ML
15 SOLUTION RESPIRATORY (INHALATION)
COMMUNITY
End: 2020-10-01

## 2020-01-27 RX ORDER — ATORVASTATIN CALCIUM 20 MG/1
20 TABLET, FILM COATED ORAL NIGHTLY
Qty: 90 TABLET | Refills: 3 | Status: SHIPPED | OUTPATIENT
Start: 2020-01-27

## 2020-01-27 RX ORDER — LOSARTAN POTASSIUM 100 MG/1
100 TABLET ORAL DAILY
COMMUNITY
End: 2021-04-27

## 2020-01-27 NOTE — PATIENT INSTRUCTIONS
Atrial Fibrillation  Atrial fibrillation is a type of irregular or rapid heartbeat (arrhythmia). In atrial fibrillation, the top part of the heart (atria) quivers in a chaotic pattern. This makes the heart unable to pump blood normally. Having atrial fibrillation can increase your risk for other health problems, such as:  · Blood can pool in the atria and form clots. If a clot travels to the brain, it can cause a stroke.  · The heart muscle may weaken from the irregular blood flow. This can cause heart failure.  Atrial fibrillation may start suddenly and stop on its own, or it may become a long-lasting problem.  What are the causes?  This condition is caused by some heart-related conditions or procedures, including:  · High blood pressure. This is the most common cause.  · Heart failure.  · Heart valve conditions.  · Inflammation of the sac that surrounds the heart (pericarditis).  · Heart surgery.  · Coronary artery disease.  · Certain heart rhythm disorders, such as Sherman-Parkinson-White syndrome.  Other causes include:  · Pneumonia.  · Obstructive sleep apnea.  · Lung cancer.  · Thyroid problems, especially if the thyroid is overactive (hyperthyroidism).  · Excessive alcohol or drug use.  Sometimes, the cause of this condition is not known.  What increases the risk?  This condition is more likely to develop in:  · Older people.  · People who smoke.  · People who have diabetes mellitus.  · People who are overweight (obese).  · Athletes who exercise vigorously.  · People who have a family history.  What are the signs or symptoms?  Symptoms of this condition include:  · A feeling that your heart is beating rapidly or irregularly.  · A feeling of discomfort or pain in your chest.  · Shortness of breath.  · Sudden light-headedness or weakness.  · Getting tired easily during exercise.  In some cases, there are no symptoms.  How is this diagnosed?  Your health care provider may be able to detect atrial fibrillation when  taking your pulse. If detected, this condition may be diagnosed with:  · Electrocardiogram (ECG).  · Ambulatory cardiac monitor. This device records your heartbeats for 24 hours or more.  · Transthoracic echocardiogram (TTE) to evaluate how blood flows through your heart.  · Transesophageal echocardiogram (FLORENCIO) to view more detailed images of your heart.  · A stress test.  · Imaging tests, such as a CT scan or chest X-ray.  · Blood tests.  How is this treated?  This condition may be treated with:  · Medicines to slow down the heart rate or bring the heart's rhythm back to normal.  · Medicines to prevent blood clots from forming.  · Electrical cardioversion. This delivers a low-energy shock to the heart to reset its rhythm.  · Ablation. This procedure destroys the part of the heart tissue that sends abnormal signals.  · Left atrial appendage occlusion/excision. This seals off a common place in the atria where blood clots can form (left atrial appendage).  The goal of treatment is to prevent blood clots from forming and to keep your heart beating at a normal rate and rhythm. Treatment depends on underlying medical conditions and how you feel when you are experiencing fibrillation.  Follow these instructions at home:  Medicines  · Take over-the counter and prescription medicines only as told by your health care provider.  · If your health care provider prescribed a blood-thinning medicine (anticoagulant), take it exactly as told. Taking too much blood-thinning medicine can cause bleeding. Taking too little can enable a blood clot to form and travel to the brain, causing a stroke.  Lifestyle         · Do not use any products that contain nicotine or tobacco, such as cigarettes and e-cigarettes. If you need help quitting, ask your health care provider.  · Do not drink beverages that contain caffeine, such as coffee, soda, and tea.  · Follow diet instructions as told by your health care provider.  · Exercise regularly as  "told by your health care provider.  · Do not drink alcohol.  General instructions  · If you have obstructive sleep apnea, manage your condition as told by your health care provider.  · Maintain a healthy weight. Do not use diet pills unless your health care provider approves. Diet pills may make heart problems worse.  · Keep all follow-up visits as told by your health care provider. This is important.  Contact a health care provider if you:  · Notice a change in the rate, rhythm, or strength of your heartbeat.  · Are taking an anticoagulant and you notice increased bruising.  · Tire more easily when you exercise or exert yourself.  · Have a sudden change in weight.  Get help right away if you have:    · Chest pain, abdominal pain, sweating, or weakness.  · Difficulty breathing.  · Blood in your vomit, stool (feces), or urine.  · Any symptoms of a stroke. \"BE FAST\" is an easy way to remember the main warning signs of a stroke:  ? B - Balance. Signs are dizziness, sudden trouble walking, or loss of balance.  ? E - Eyes. Signs are trouble seeing or a sudden change in vision.  ? F - Face. Signs are sudden weakness or numbness of the face, or the face or eyelid drooping on one side.  ? A - Arms. Signs are weakness or numbness in an arm. This happens suddenly and usually on one side of the body.  ? S - Speech. Signs are sudden trouble speaking, slurred speech, or trouble understanding what people say.  ? T - Time. Time to call emergency services. Write down what time symptoms started.  · Other signs of a stroke, such as:  ? A sudden, severe headache with no known cause.  ? Nausea or vomiting.  ? Seizure.  These symptoms may represent a serious problem that is an emergency. Do not wait to see if the symptoms will go away. Get medical help right away. Call your local emergency services (911 in the [place] Do not drive yourself to the hospital.  Summary  · Atrial fibrillation is a type of irregular or rapid heartbeat " (arrhythmia).  · Symptoms include a feeling that your heart is beating fast or irregularly. In some cases, you may not have symptoms.  · The condition is treated with medicines to slow down the heart rate or bring the heart's rhythm back to normal. You may also need blood-thinning medicines to prevent blood clots.  · Get help right away if you have symptoms or signs of a stroke.  This information is not intended to replace advice given to you by your health care provider. Make sure you discuss any questions you have with your health care provider.  Document Released: 12/18/2006 Document Revised: 02/08/2019 Document Reviewed: 02/08/2019  [employer] Interactive Patient Education © 2019 [employer]

## 2020-01-27 NOTE — PROGRESS NOTES
Subjective   Kathleen Allen is a 84 y.o. female     Chief Complaint   Patient presents with   • Follow-up     Here for a 6 month follow up       HPI    Problem List:    1. Atrial Fibrillation by EKG 4/24/19 [id number] 5 Xarelto & Eliquis too costly, coumadin, intolerant, patient does not want any anticoag  1.1 event monitor 5/8-5/21/19-A. fib CVR  2. Hypertension  3. Hyperlipidemia  4. PVD   4.1 BLE arterial US 4/17/19 - occlusion of the common femoral artery on the left with no flow to the left posterior tibial artery.  Mod plaque throughout RLE with blunted flow to popliteal.  Decreased inflow to the right lower extremity indicating central arterial disease   4.2 left femoropopliteal 6/4/19 with Dr. Maki  5. Shortness of breath  5.1 echo 5/6/19-mild LVH, diastolic dysfunction 1/2, moderate left atrial and moderate to severe right atrial enlargement, moderate to severe TR, mild MR, PA pressures in the 60s  6. H/O lymph node left groin 2009 3 chemo tx's and 30 rad tx's  7.  Stress test 5/6/19-no scintographic evidence of ischemia, post-rest EF 71%  8.  COPD/emphysema, O2 2 L at at bedtime and as needed with activity, Dr. Francisco    Patient is an 84-year-old female who presents today for follow-up with her  at her side.  She denies any chest pain, pressure, palpitations, fluttering, dizziness, presyncope, syncope, orthopnea, PND or edema.  She says she will get lightheaded when her oxygen is low.  She says that she does have shortness of breath but she has been doing much better since being on oxygen and her inhalers that Dr. Francisco has given her.  She says she stays tired and this has not changed.  She says she had a good report with Dr. Maki.  She says he stopped her Coumadin because she kept bleeding real bad.  Patient understands the risk of having a stroke by not being on any anticoagulation however she does not want to be on any at this time.  She says the Eliquis and Xarelto were too expensive  through her insurance company.  I did advise their assistance programs and that we could offer her some samples she does not feel like she needs them and does not again want any anticoagulation at this time and understands she does have increased risk of a stroke should she have any episodes.    Current Outpatient Medications on File Prior to Visit   Medication Sig Dispense Refill   • arformoterol (BROVANA) 15 MCG/2ML nebulizer solution Take 15 mcg by nebulization 2 (Two) Times a Day.     • atenolol (TENORMIN) 25 MG tablet Take 1 tablet by mouth Daily. (Patient taking differently: Take 25 mg by mouth Every Morning.) 30 tablet 11   • calcium carbonate (OS-MIGUEL) 600 MG tablet Take 1,200 mg by mouth Daily.     • clopidogrel (PLAVIX) 75 MG tablet TAKE 1 TABLET BY MOUTH ONCE DAILY 90 tablet 0   • levothyroxine (SYNTHROID, LEVOTHROID) 125 MCG tablet Take 125 mcg by mouth Every Morning.     • losartan (COZAAR) 100 MG tablet Take 100 mg by mouth Daily.     • potassium chloride (K-DUR) 10 MEQ CR tablet Take 20 mEq by mouth Daily.     • torsemide (DEMADEX) 20 MG tablet Take 20 mg by mouth 3 (Three) Times a Week. Three times weekly and prn     • vitamin C (ASCORBIC ACID) 500 MG tablet Take 1,000 mg by mouth Every Morning.     • [DISCONTINUED] atorvastatin (LIPITOR) 20 MG tablet Take 1 tablet by mouth Daily. (Patient taking differently: Take 20 mg by mouth Every Night.) 90 tablet 3   • [DISCONTINUED] aspirin 81 MG tablet Take 81 mg by mouth Every Morning. 30 tablet 11   • [DISCONTINUED] HYDROcodone-acetaminophen (NORCO) 7.5-325 MG per tablet Take 1 tablet by mouth Every 6 (Six) Hours As Needed for Moderate Pain . 30 tablet 0   • [DISCONTINUED] losartan-hydrochlorothiazide (HYZAAR) 100-25 MG per tablet Take 1 tablet by mouth Every Morning.     • [DISCONTINUED] warfarin (COUMADIN) 5 MG tablet Take 0.5 tablets by mouth Every Night. Stopped per surgeon for AA with runoff (Patient taking differently: Take 2.5 mg by mouth Every Night.  Stopped per Dr. Adam orders per patient)       No current facility-administered medications on file prior to visit.        ALLERGIES    Sulfa antibiotics    Past Medical History:   Diagnosis Date   • A-fib (CMS/[facility])     on coumadin./ sees Dr. Bailey    • Arthritis    • Constipation     related to meds    • COPD (chronic obstructive pulmonary disease) (CMS/[facility])     will see a pulmonologist 2019   • History of pneumonia 2017    hospitalized    • Hypertension    • Hypothyroid     po meds daily    • Lymph node cancer (CMS/[facility])     Left groin-Lymphoma; chemo and radiation    • Peripheral vascular disease (CMS/[facility])    • PONV (postoperative nausea and vomiting)    • Shortness of breath    • Wears dentures     upper and lower        Social History     Socioeconomic History   • Marital status:      Spouse name: Not on file   • Number of children: 5   • Years of education: Not on file   • Highest education level: Not on file   Occupational History   • Occupation: Office and Clerical     Comment: Retired   Tobacco Use   • Smoking status: Former Smoker     Packs/day: 1.00     Years: 50.00     Pack years: 50.00     Types: Cigarettes     Last attempt to quit:      Years since quittin.0   • Smokeless tobacco: Never Used   Substance and Sexual Activity   • Alcohol use: No     Frequency: Never   • Drug use: No   • Sexual activity: Defer   Social History Narrative    Lives in [place].        Family History   Problem Relation Age of Onset   • Heart disease Father    • Heart attack Father        Review of Systems   Constitutional: Positive for fatigue (stays tired ). Negative for chills and fever.   HENT: Negative for congestion, rhinorrhea and sore throat.         Visible pulse in right side of neck    Eyes: Positive for visual disturbance (reading glasses ).   Respiratory: Positive for cough (worse at night ) and shortness of breath (using O2 @ HS and PRN during the day with activity, doing very  "well, Dr. Francisco ). Negative for chest tightness.         Has oxygen to use nightly and prn during the day    Cardiovascular: Negative for chest pain, palpitations and leg swelling.   Gastrointestinal: Negative for abdominal pain, blood in stool, nausea and vomiting.   Endocrine: Positive for cold intolerance. Negative for heat intolerance.   Genitourinary: Negative for dysuria, frequency, hematuria and urgency.   Musculoskeletal: Negative for arthralgias and back pain.   Skin: Negative for rash and wound.   Allergic/Immunologic: Negative for environmental allergies and food allergies.   Neurological: Positive for light-headedness (when o2 sats are low ). Negative for dizziness and weakness.   Hematological: Bruises/bleeds easily (bruises easily ).   Psychiatric/Behavioral: Negative for sleep disturbance (denies waking with smothering ).       Objective   /64 (BP Location: Left arm, Patient Position: Sitting)   Pulse 59   Ht 154.9 cm (61\")   Wt 54.9 kg (121 lb)   SpO2 94% Comment: currently on oxygen 2lpm  BMI 22.86 kg/m²   Vitals:    01/27/20 1340   BP: 112/64   BP Location: Left arm   Patient Position: Sitting   Pulse: 59   SpO2: 94%   Weight: 54.9 kg (121 lb)   Height: 154.9 cm (61\")      Lab Results (most recent)     None        Physical Exam   Constitutional: She is oriented to person, place, and time. Vital signs are normal. She appears well-developed and well-nourished. She is active and cooperative.   HENT:   Head: Normocephalic.   Mouth/Throat: She has dentures (upper and lower ).   Eyes: Lids are normal.   Neck: Normal carotid pulses, no hepatojugular reflux and no JVD present. Carotid bruit is not present.   Cardiovascular: Regular rhythm and normal heart sounds. Bradycardia present.   Pulses:       Radial pulses are 2+ on the right side, and 2+ on the left side.        Dorsalis pedis pulses are 2+ on the right side, and 2+ on the left side.        Posterior tibial pulses are 2+ on the right " side, and 2+ on the left side.   Trace edema LLE; no edema RLE; varicose veins BLE.    Pulmonary/Chest: Effort normal and breath sounds normal.   O2 2L NC    Abdominal: Normal appearance and bowel sounds are normal.   Neurological: She is alert and oriented to person, place, and time.   Skin: Skin is warm, dry and intact.   Psychiatric: She has a normal mood and affect. Her speech is normal and behavior is normal. Judgment and thought content normal. Cognition and memory are normal.       Procedure   Procedures         Assessment/Plan      Diagnosis Plan   1. Atherosclerosis of native artery of both lower extremities with intermittent claudication (CMS/HCC)      2. Paroxysmal atrial fibrillation (CMS/HCC)     3. PVD (peripheral vascular disease) (CMS/HCC)  atorvastatin (LIPITOR) 20 MG tablet   4. Bilateral Peripheral arterial disease with LLE claudication (CMS/[facility])     5. Essential hypertension     6. Shortness of breath     7. Peripheral edema     8. S/P peripheral artery bypass. Fem fem, and L Fem Pop 6/4/19         Return in about 6 months (around 7/27/2020).  CAD-patient is on statin, beta and Plavix.  Paroxysmal A. fib-patient is on Plavix and beta-blocker.  PVD-patient's on statin and Plavix.  Hypertension-patient is doing very well on atenolol and losartan.  Shortness of breath-stable.  Peripheral edema-patient uses furosemide.  She will continue her medication regimen.  She will follow-up in 6 months or sooner if any changes.         Kathleen SIMA Allen  reports that she quit smoking about 12 years ago. Her smoking use included cigarettes. She has a 50.00 pack-year smoking history. She has never used smokeless tobacco..    Patient's Body mass index is 22.86 kg/m². BMI is within normal parameters. No follow-up required..      Electronically signed by:

## 2020-03-16 RX ORDER — CLOPIDOGREL BISULFATE 75 MG/1
TABLET ORAL
Qty: 90 TABLET | Refills: 1 | Status: SHIPPED | OUTPATIENT
Start: 2020-03-16 | End: 2020-10-01 | Stop reason: ALTCHOICE

## 2020-10-01 ENCOUNTER — OFFICE VISIT (OUTPATIENT)
Dept: CARDIOLOGY | Facility: CLINIC | Age: 85
End: 2020-10-01

## 2020-10-01 VITALS
HEIGHT: 61 IN | WEIGHT: 115 LBS | OXYGEN SATURATION: 95 % | HEART RATE: 80 BPM | BODY MASS INDEX: 21.71 KG/M2 | SYSTOLIC BLOOD PRESSURE: 159 MMHG | DIASTOLIC BLOOD PRESSURE: 72 MMHG

## 2020-10-01 DIAGNOSIS — I73.9 PERIPHERAL ARTERIAL DISEASE (HCC): ICD-10-CM

## 2020-10-01 DIAGNOSIS — I48.21 PERMANENT ATRIAL FIBRILLATION (HCC): ICD-10-CM

## 2020-10-01 DIAGNOSIS — E78.5 HYPERLIPIDEMIA, UNSPECIFIED HYPERLIPIDEMIA TYPE: ICD-10-CM

## 2020-10-01 DIAGNOSIS — I10 ESSENTIAL HYPERTENSION: Primary | ICD-10-CM

## 2020-10-01 DIAGNOSIS — R06.02 SHORTNESS OF BREATH: ICD-10-CM

## 2020-10-01 DIAGNOSIS — I70.213 ATHEROSCLEROSIS OF NATIVE ARTERY OF BOTH LOWER EXTREMITIES WITH INTERMITTENT CLAUDICATION (HCC): ICD-10-CM

## 2020-10-01 DIAGNOSIS — Z98.890 S/P PERIPHERAL ARTERY BYPASS: ICD-10-CM

## 2020-10-01 PROCEDURE — 93000 ELECTROCARDIOGRAM COMPLETE: CPT | Performed by: NURSE PRACTITIONER

## 2020-10-01 PROCEDURE — 99214 OFFICE O/P EST MOD 30 MIN: CPT | Performed by: NURSE PRACTITIONER

## 2020-10-01 RX ORDER — TRIAMTERENE AND HYDROCHLOROTHIAZIDE 37.5; 25 MG/1; MG/1
1 TABLET ORAL DAILY
COMMUNITY
End: 2021-04-27

## 2020-10-01 RX ORDER — HYDROCHLOROTHIAZIDE 25 MG/1
25 TABLET ORAL DAILY
COMMUNITY
End: 2021-04-27

## 2020-10-01 RX ORDER — ATENOLOL 25 MG/1
25 TABLET ORAL DAILY
Qty: 90 TABLET | Refills: 3 | Status: SHIPPED | OUTPATIENT
Start: 2020-10-01 | End: 2021-05-21 | Stop reason: HOSPADM

## 2020-10-01 RX ORDER — ASPIRIN 325 MG
325 TABLET ORAL DAILY
COMMUNITY

## 2020-10-01 NOTE — PROGRESS NOTES
Subjective   Kathleen Allen is a 84 y.o. female     Chief Complaint   Patient presents with   • Follow-up       HPI    Problem List:    1. Atrial Fibrillation by EKG 4/24/19 [id number] 5 Xarelto & Eliquis too costly, coumadin, intolerant, patient does not want any anticoag  1.1 event monitor 5/8-5/21/19-A. fib CVR  2. Hypertension  3. Hyperlipidemia  4. PVD   4.1 BLE arterial US 4/17/19 - occlusion of the common femoral artery on the left with no flow to the left posterior tibial artery.  Mod plaque throughout RLE with blunted flow to popliteal.  Decreased inflow to the right lower extremity indicating central arterial disease   4.2 left femoropopliteal 6/4/19 with Dr. Maki  5. Shortness of breath  5.1 echo 5/6/19-mild LVH, diastolic dysfunction 1/2, moderate left atrial and moderate to severe right atrial enlargement, moderate to severe TR, mild MR, PA pressures in the 60s  6. H/O lymph node left groin 2009 3 chemo tx's and 30 rad tx's  7.  Stress test 5/6/19-no scintographic evidence of ischemia, post-rest EF 71%  8.  COPD/emphysema, O2 2 L at at bedtime and as needed with activity, Dr. Francisco    Patient is an 84-year-old female who presents today for a follow-up.  She denies any chest pain, pressure, palpitations, fluttering, dizziness, presyncope, syncope, orthopnea, PND or edema.  She says she does get short of breath and this has not changed but she only has use her oxygen as needed.  She says she has been having a lot of coughing when she lays down at night and Dr. Tellez has been trying to take care of this for her.  She says that Dr. Maki stopped her Plavix and put her on just a full dose of aspirin.  She does still complain of some claudication symptoms in her left lower extremity.    Current Outpatient Medications on File Prior to Visit   Medication Sig Dispense Refill   • aspirin 325 MG tablet Take 325 mg by mouth Daily.     • atorvastatin (LIPITOR) 20 MG tablet Take 1 tablet by mouth Every  Night. 90 tablet 3   • calcium carbonate (OS-MIGUEL) 600 MG tablet Take 1,200 mg by mouth Daily.     • hydroCHLOROthiazide (HYDRODIURIL) 25 MG tablet Take 25 mg by mouth Daily.     • levothyroxine (SYNTHROID, LEVOTHROID) 125 MCG tablet Take 125 mcg by mouth Every Morning.     • losartan (COZAAR) 100 MG tablet Take 100 mg by mouth Daily.     • potassium chloride (K-DUR) 10 MEQ CR tablet Take 20 mEq by mouth Daily.     • torsemide (DEMADEX) 20 MG tablet Take 20 mg by mouth 3 (Three) Times a Week. Three times weekly and prn     • triamterene-hydrochlorothiazide (MAXZIDE-25) 37.5-25 MG per tablet Take 1 tablet by mouth Daily.     • vitamin C (ASCORBIC ACID) 500 MG tablet Take 1,000 mg by mouth Every Morning.     • [DISCONTINUED] arformoterol (BROVANA) 15 MCG/2ML nebulizer solution Take 15 mcg by nebulization 2 (Two) Times a Day.     • [DISCONTINUED] atenolol (TENORMIN) 25 MG tablet Take 1 tablet by mouth Daily. (Patient taking differently: Take 25 mg by mouth Every Morning.) 30 tablet 11   • [DISCONTINUED] clopidogrel (PLAVIX) 75 MG tablet Take 1 tablet by mouth once daily 90 tablet 1     No current facility-administered medications on file prior to visit.        ALLERGIES    Sulfa antibiotics    Past Medical History:   Diagnosis Date   • A-fib (CMS/[facility])     on coumadin./ sees Dr. Bailey    • Arthritis    • Constipation     related to meds    • COPD (chronic obstructive pulmonary disease) (CMS/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)    • PONV (postoperative nausea and vomiting)    • Shortness of breath    • Wears dentures     upper and lower        Social History     Socioeconomic History   • Marital status:      Spouse name: Not on file   • Number of children: 5   • Years of education: Not  "on file   • Highest education level: Not on file   Occupational History   • Occupation: Office and Clerical     Comment: Retired   Tobacco Use   • Smoking status: Former Smoker     Packs/day: 1.00     Years: 50.00     Pack years: 50.00     Types: Cigarettes     Quit date:      Years since quittin.7   • Smokeless tobacco: Never Used   Substance and Sexual Activity   • Alcohol use: No     Frequency: Never   • Drug use: No   • Sexual activity: Defer   Social History Narrative    Lives in [place].        Family History   Problem Relation Age of Onset   • Heart disease Father    • Heart attack Father        Review of Systems   Constitutional: Negative.  Negative for chills, fatigue and fever.   HENT: Negative.  Negative for congestion, rhinorrhea and sore throat.    Eyes: Positive for visual disturbance (glasses ).   Respiratory: Positive for cough (laying down at night) and shortness of breath (uses O2 as needed). Negative for chest tightness and wheezing.    Cardiovascular: Negative.  Negative for chest pain, palpitations and leg swelling.   Gastrointestinal: Negative.  Negative for abdominal pain, blood in stool, nausea and vomiting.   Endocrine: Negative.  Negative for cold intolerance and heat intolerance.   Genitourinary: Negative.  Negative for dysuria, frequency, hematuria and urgency.   Musculoskeletal: Negative.  Negative for arthralgias and back pain.   Skin: Negative.  Negative for rash and wound.        Dry skin   Allergic/Immunologic: Positive for environmental allergies (seasonal). Negative for food allergies.   Neurological: Negative.  Negative for dizziness, weakness and light-headedness.   Hematological: Bruises/bleeds easily (bruising and bleeding).   Psychiatric/Behavioral: Negative.  Negative for sleep disturbance (denies waking with smothering at night).       Objective   /72 (BP Location: Left arm, Patient Position: Sitting)   Pulse 80   Ht 154.9 cm (61\")   Wt 52.2 kg (115 lb)  " " SpO2 95%   BMI 21.73 kg/m²   Vitals:    10/01/20 1100   BP: 159/72   BP Location: Left arm   Patient Position: Sitting   Pulse: 80   SpO2: 95%   Weight: 52.2 kg (115 lb)   Height: 154.9 cm (61\")      Lab Results (most recent)     None        Physical Exam  Vitals signs reviewed.   Constitutional:       General: She is awake.      Appearance: Normal appearance. She is well-developed, well-groomed and normal weight.   HENT:      Head: Normocephalic.   Eyes:      General: Lids are normal.   Neck:      Vascular: No carotid bruit, hepatojugular reflux or JVD.   Cardiovascular:      Rate and Rhythm: Normal rate. Rhythm irregularly irregular.      Pulses:           Radial pulses are 2+ on the right side and 2+ on the left side.        Dorsalis pedis pulses are 1+ on the right side and 1+ on the left side.        Posterior tibial pulses are 1+ on the right side and 1+ on the left side.      Heart sounds: Normal heart sounds.      Comments: Spider veins bilateral lower extremities  Pulmonary:      Effort: Pulmonary effort is normal.      Breath sounds: Normal breath sounds and air entry.   Abdominal:      General: Abdomen is flat. Bowel sounds are normal.      Palpations: Abdomen is soft.   Musculoskeletal:      Right lower leg: No edema.      Left lower leg: No edema.   Skin:     General: Skin is warm and dry.   Neurological:      Mental Status: She is alert and oriented to person, place, and time.   Psychiatric:         Attention and Perception: Attention and perception normal.         Mood and Affect: Mood and affect normal.         Speech: Speech normal.         Behavior: Behavior normal. Behavior is cooperative.         Thought Content: Thought content normal.         Cognition and Memory: Cognition normal.         Judgment: Judgment normal.         Procedure     ECG 12 Lead    Date/Time: 10/1/2020 12:10 PM  Performed by: Barbara Villarreal APRN  Authorized by: Barbara Villarreal APRN   Comparison: compared with previous " ECG from 4/24/2019  Rhythm: atrial fibrillation  Rate: normal  BPM: 70  Conduction: left posterior fascicular block  Q waves: V1 and V2    QRS axis: right    Clinical impression: abnormal EKG                 Assessment/Plan      Diagnosis Plan   1. Essential hypertension     2. Permanent atrial fibrillation (CMS/HCC)  atenolol (TENORMIN) 25 MG tablet   3. Atherosclerosis of native artery of both lower extremities with intermittent claudication (CMS/HCC)      4. Shortness of breath     5. S/P peripheral artery bypass. Fem fem, and L Fem Pop 6/4/19     6. Bilateral Peripheral arterial disease with LLE claudication (CMS/HCC)     7. Hyperlipidemia, unspecified hyperlipidemia type         Return in about 6 months (around 4/1/2021).  Hypertension-patient thinks that the physician accidentally kept her bottle of atenolol and she never had any other refills on it so she has not been taking it.  I am going to send that in today as her blood pressure is slightly elevated.  She will continue her hydrochlorothiazide and her losartan as well as her Maxide.  A. fib-patient will continue her atenolol and she is only on aspirin 325 as she refused anticoagulation.  She understands the risk of stroke.  PVD/claudication/history of femoropopliteal-patient is on aspirin and statin.  Dyslipidemia-patient's on statin monitored by PCP.  Shortness of breath-stable and followed by Dr. Francisco.  She will continue her medication regimen.  She will follow-up in 6 months or sooner if any changes.    Patient does have some claudication symptoms and decreased pedal pulses but does not want a repeat bilateral lower extremity ultrasound at this time.         Kathleen Allen  reports that she quit smoking about 12 years ago. Her smoking use included cigarettes. She has a 50.00 pack-year smoking history. She has never used smokeless tobacco.         Patient's Body mass index is 21.73 kg/m². BMI is within normal parameters. No follow-up  required..    Advance Care Planning   ACP discussion was held with the patient during this visit. Patient has an advance directive (not in EMR), copy requested.    Electronically signed by:

## 2021-03-16 ENCOUNTER — TELEPHONE (OUTPATIENT)
Dept: CARDIAC SURGERY | Facility: CLINIC | Age: 86
End: 2021-03-16

## 2021-03-16 NOTE — TELEPHONE ENCOUNTER
MAILED RECALL LETTER ON 12/21/20  Called pt to discuss f/u, she states she has some kidney issues right now and doesn't want to add more appointments on herself at this time. I informed the pt to call our office when she is able to and we'd try t gt her scheduled, she understood.

## 2021-04-27 ENCOUNTER — OFFICE VISIT (OUTPATIENT)
Dept: CARDIOLOGY | Facility: CLINIC | Age: 86
End: 2021-04-27

## 2021-04-27 VITALS
DIASTOLIC BLOOD PRESSURE: 44 MMHG | HEIGHT: 60 IN | HEART RATE: 47 BPM | TEMPERATURE: 97.6 F | WEIGHT: 106 LBS | BODY MASS INDEX: 20.81 KG/M2 | OXYGEN SATURATION: 93 % | SYSTOLIC BLOOD PRESSURE: 101 MMHG

## 2021-04-27 DIAGNOSIS — I73.9 PERIPHERAL ARTERIAL DISEASE (HCC): ICD-10-CM

## 2021-04-27 DIAGNOSIS — I48.0 PAROXYSMAL ATRIAL FIBRILLATION (HCC): ICD-10-CM

## 2021-04-27 DIAGNOSIS — I10 ESSENTIAL HYPERTENSION: ICD-10-CM

## 2021-04-27 DIAGNOSIS — I07.1 SEVERE TRICUSPID REGURGITATION: ICD-10-CM

## 2021-04-27 DIAGNOSIS — I27.20 PULMONARY HYPERTENSION (HCC): ICD-10-CM

## 2021-04-27 DIAGNOSIS — I70.213 ATHEROSCLEROSIS OF NATIVE ARTERY OF BOTH LOWER EXTREMITIES WITH INTERMITTENT CLAUDICATION (HCC): ICD-10-CM

## 2021-04-27 DIAGNOSIS — R06.02 SHORTNESS OF BREATH: ICD-10-CM

## 2021-04-27 DIAGNOSIS — I51.89 DIASTOLIC DYSFUNCTION: ICD-10-CM

## 2021-04-27 DIAGNOSIS — R60.9 PERIPHERAL EDEMA: ICD-10-CM

## 2021-04-27 DIAGNOSIS — R09.89 DECREASED PEDAL PULSES: ICD-10-CM

## 2021-04-27 DIAGNOSIS — I73.9 CLAUDICATION (HCC): ICD-10-CM

## 2021-04-27 DIAGNOSIS — I50.813 ACUTE ON CHRONIC RIGHT-SIDED HEART FAILURE (HCC): Primary | ICD-10-CM

## 2021-04-27 DIAGNOSIS — R94.31 EKG ABNORMALITIES: ICD-10-CM

## 2021-04-27 PROCEDURE — 99214 OFFICE O/P EST MOD 30 MIN: CPT | Performed by: NURSE PRACTITIONER

## 2021-04-27 PROCEDURE — 93000 ELECTROCARDIOGRAM COMPLETE: CPT | Performed by: NURSE PRACTITIONER

## 2021-04-27 RX ORDER — METOLAZONE 5 MG/1
5 TABLET ORAL DAILY PRN
COMMUNITY

## 2021-04-27 NOTE — PROGRESS NOTES
Subjective   Kathleen Allen is a 85 y.o. female     Chief Complaint   Patient presents with   • Follow-up   • Hypertension       HPI    Problem List:    1. Atrial Fibrillation by EKG 4/24/19 [id number] 5 Xarelto & Eliquis too costly, coumadin, intolerant, patient does not want any anticoag  1.1 event monitor 5/8-5/21/19-A. fib CVR  2. Hypertension  3. Hyperlipidemia  4. PVD   4.1 BLE arterial US 4/17/19 - occlusion of the common femoral artery on the left with no flow to the left posterior tibial artery.  Mod plaque throughout RLE with blunted flow to popliteal.  Decreased inflow to the right lower extremity indicating central arterial disease   4.2 left femoropopliteal 6/4/19 with Dr. Maki  5. Shortness of breath  5.1 echo 5/6/19-mild LVH, diastolic dysfunction 1/2, moderate left atrial and moderate to severe right atrial enlargement, moderate to severe TR, mild MR, PA pressures in the 60s  5.2 echo 3/4/2021-EF 65%, right ventricular dilated, right atrium dilated, left atrium is mildly dilated, mild prolapse of the anterior mitral valve leaflet with mild regurgitation, mild pulmonic insufficiency, severe tricuspid regurgitation with a PA of 67 diastolic inflow shows normal relaxation  6. H/O lymph node left groin 2009 3 chemo tx's and 30 rad tx's  7.  Stress test 5/6/19-no scintographic evidence of ischemia, post-rest EF 71%  8.  COPD/emphysema/Severe Pulmonary Hypertension, O2 2 L at at bedtime and as needed with activity, Dr. Francisco  9.  Severe TR     Patient is an 85-year-old female who presents today for follow-up with her daughter at her side.  She denies any chest pain, pressure, palpitations, fluttering, presyncope, syncope, orthopnea or PND.  She states she does have dizziness when she rises too quickly.  She has been on a lot of diuretics due to right heart failure which she was in the hospital for back in March.  Patient has had swelling but it still swollen in her right lower extremity more than  the left but has improved significantly.  She is still short of breath pretty much with any activity that she does.  She does have oxygen to use as needed.  She is fatigued all the time.    We went over hospital records.    Current Outpatient Medications on File Prior to Visit   Medication Sig Dispense Refill   • aspirin 325 MG tablet Take 325 mg by mouth Daily.     • atenolol (TENORMIN) 25 MG tablet Take 1 tablet by mouth Daily. 90 tablet 3   • atorvastatin (LIPITOR) 20 MG tablet Take 1 tablet by mouth Every Night. 90 tablet 3   • calcium carbonate (OS-MIGUEL) 600 MG tablet Take 1,200 mg by mouth Daily.     • levothyroxine (SYNTHROID, LEVOTHROID) 125 MCG tablet Take 125 mcg by mouth Every Morning.     • metOLazone (ZAROXOLYN) 5 MG tablet Take 5 mg by mouth Daily As Needed (weight gain, keep weight 114 or less).     • potassium chloride (K-DUR) 10 MEQ CR tablet Take 20 mEq by mouth 2 (Two) Times a Day.     • torsemide (DEMADEX) 20 MG tablet Take 40 mg by mouth 2 (two) times a day.       No current facility-administered medications on file prior to visit.       ALLERGIES    Sulfa antibiotics    Past Medical History:   Diagnosis Date   • A-fib (CMS/[facility])     on coumadin./ sees Dr. Bailey    • Arthritis    • Constipation     related to meds    • COPD (chronic obstructive pulmonary disease) (CMS/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)    • PONV (postoperative nausea and vomiting)    • Shortness of breath    • Wears dentures     upper and lower        Social History     Socioeconomic History   • Marital status:      Spouse name: Not on file   • Number of children: 5   • Years of education: Not on file   • Highest education level:  Not on file   Tobacco Use   • Smoking status: Former Smoker     Packs/day: 1.00     Years: 50.00     Pack years: 50.00     Types: Cigarettes     Quit date:      Years since quittin.3   • Smokeless tobacco: Never Used   Substance and Sexual Activity   • Alcohol use: No   • Drug use: No   • Sexual activity: Defer       Family History   Problem Relation Age of Onset   • Heart disease Father    • Heart attack Father        Review of Systems   Constitutional: Positive for fatigue (tired all the time ). Negative for appetite change, chills, diaphoresis and fever.   HENT: Negative for congestion, rhinorrhea and sore throat.    Eyes: Positive for visual disturbance (glasses for reading ).   Respiratory: Positive for shortness of breath (when she is walking; no real change since in the hospital; sleeps with O2 and uses PRN, she can get around now when she couldn't then). Negative for cough, chest tightness and wheezing.    Cardiovascular: Positive for leg swelling (legs feet and ankles right foot still has some swollen; since  ). Negative for chest pain and palpitations.   Gastrointestinal: Negative for abdominal pain, blood in stool, constipation, diarrhea, nausea and vomiting.   Endocrine: Positive for cold intolerance (cold all the time ). Negative for heat intolerance.   Genitourinary: Positive for frequency (Pt states she goes all the time ). Negative for difficulty urinating, dysuria, hematuria and urgency.   Musculoskeletal: Positive for arthralgias (all over ) and back pain (waiste line if she is up on her feet for a long period of time she has pain ). Negative for joint swelling, neck pain and neck stiffness.   Skin: Positive for wound (right leg ). Negative for color change, pallor and rash.   Allergic/Immunologic: Negative for environmental allergies and food allergies.   Neurological: Positive for dizziness (when she gets  up fast ). Negative for weakness, light-headedness, numbness and  "headaches.   Hematological: Bruises/bleeds easily (bruises ).   Psychiatric/Behavioral: Positive for sleep disturbance (with oxygen ).       Objective   /44 (BP Location: Left arm)   Pulse (!) 47   Temp 97.6 °F (36.4 °C)   Ht 152.4 cm (60\")   Wt 48.1 kg (106 lb)   SpO2 93%   BMI 20.70 kg/m²   Vitals:    21 1256   BP: 101/44   BP Location: Left arm   Pulse: (!) 47   Temp: 97.6 °F (36.4 °C)   SpO2: 93%   Weight: 48.1 kg (106 lb)   Height: 152.4 cm (60\")      Lab Results (most recent)     None        Physical Exam  Vitals reviewed.   Constitutional:       General: She is awake.      Appearance: Normal appearance. She is well-developed, well-groomed and normal weight.   HENT:      Head: Normocephalic.   Eyes:      General: Lids are normal.   Neck:      Vascular: No carotid bruit, hepatojugular reflux or JVD.   Cardiovascular:      Rate and Rhythm: Bradycardia present. Rhythm irregularly irregular.      Pulses:           Radial pulses are 2+ on the right side and 2+ on the left side.      Heart sounds: Normal heart sounds.      Comments: Decreased pedal pulses BLE  Pulmonary:      Effort: Pulmonary effort is normal.      Breath sounds: Normal air entry. Examination of the right-lower field reveals decreased breath sounds. Examination of the left-lower field reveals decreased breath sounds. Decreased breath sounds present.   Abdominal:      General: Bowel sounds are normal.      Palpations: Abdomen is soft.   Musculoskeletal:      Right lower le+ Edema present.      Left lower le+ Edema present.   Skin:     General: Skin is warm and dry.      Findings: Bruising present.   Neurological:      Mental Status: She is alert and oriented to person, place, and time.   Psychiatric:         Attention and Perception: Attention and perception normal.         Mood and Affect: Mood and affect normal.         Speech: Speech normal.         Behavior: Behavior normal. Behavior is cooperative.         Thought " Content: Thought content normal.         Cognition and Memory: Cognition and memory normal.         Judgment: Judgment normal.         Procedure     ECG 12 Lead    Date/Time: 4/27/2021 1:45 PM  Performed by: Barbara Villarreal APRN  Authorized by: Barbara Villarreal APRN   Comparison: compared with previous ECG from 10/1/2020  Comparison to previous ECG: St depression on todays ekg not on prior ekg   Rhythm: atrial fibrillation  Rate: bradycardic  BPM: 52  ST Depression: II, III, aVF, V3, V4 and V5  QRS axis: right    Clinical impression: abnormal EKG                 Assessment/Plan      Diagnosis Plan   1. Acute on chronic right-sided heart failure (CMS/HCC)  Stress Test With Myocardial Perfusion One Day   2. Paroxysmal atrial fibrillation (CMS/HCC)  ECG 12 Lead    Stress Test With Myocardial Perfusion One Day   3. Atherosclerosis of native artery of both lower extremities with intermittent claudication (CMS/HCC)      4. Essential hypertension  ECG 12 Lead    Stress Test With Myocardial Perfusion One Day   5. Shortness of breath  Stress Test With Myocardial Perfusion One Day   6. Peripheral edema  Duplex Lower Extremity Art / Grafts - Bilateral CAR   7. Diastolic dysfunction     8. Pulmonary hypertension (CMS/HCC)     9. Peripheral arterial disease (CMS/HCC)  Duplex Lower Extremity Art / Grafts - Bilateral CAR   10. Claudication (CMS/HCC)  Duplex Lower Extremity Art / Grafts - Bilateral CAR   11. Decreased pedal pulses  Duplex Lower Extremity Art / Grafts - Bilateral CAR   12. Severe tricuspid regurgitation     13. EKG abnormalities  Stress Test With Myocardial Perfusion One Day       Return in about 12 weeks (around 7/20/2021).       A. fib-patient is on aspirin and beta-blocker as she refuses any anticoagulation.  PVD/decreased pedal pulses/claudication/peripheral edema-patient have a bilateral lower extremity arterial ultrasound.  She has a history of femoropopliteal to the left.  Hypertension-patient's blood  pressure is actually on the lower side of normal on beta-blocker.  Shortness of breath/peripheral edema/right-sided heart failure-patient is on torsemide and then metolazone as needed for weight gain.  Right-sided heart failure/A. Fib/hypertension/shortness of breath/EKG abnormalities-patient will proceed with stress test.  I will confirm with vascular surgery if they need a right and left heart cath prior to referral if so then we will not need to proceed with stress test.  If they do not then we will proceed with stress test and then refer patient accordingly.  She will continue her medication regimen.  She will follow-up in 12 weeks or sooner if any changes or abnormalities with testing.    Cancelling stress test as Dr. Walsh reviewed patient's Echo [facility] from [facility] and she is going to perform R and LHC on patient.     Kathleen SIMA Allen  reports that she quit smoking about 13 years ago. Her smoking use included cigarettes. She has a 50.00 pack-year smoking history. She has never used smokeless tobacco..     Patient's Body mass index is 20.7 kg/m². BMI is within normal parameters. No follow-up required..Advance Care Planning   ACP discussion was declined by the patient. Patient has an advance directive (not in EMR), copy requested.      Electronically signed by:

## 2021-04-27 NOTE — PATIENT INSTRUCTIONS
Advance Care Planning and Advance Directives     You make decisions on a daily basis - decisions about where you want to live, your career, your home, your life. Perhaps one of the most important decisions you face is your choice for future medical care. Take time to talk with your family and your healthcare team and start planning today.  Advance Care Planning is a process that can help you:  · Understand possible future healthcare decisions in light of your own experiences  · Reflect on those decision in light of your goals and values  · Discuss your decisions with those closest to you and the healthcare professionals that care for you  · Make a plan by creating a document that reflects your wishes    Surrogate Decision Maker  In the event of a medical emergency, which has left you unable to communicate or to make your own decisions, you would need someone to make decisions for you.  It is important to discuss your preferences for medical treatment with this person while you are in good health.     Qualities of a surrogate decision maker:  • Willing to take on this role and responsibility  • Knows what you want for future medical care  • Willing to follow your wishes even if they don't agree with them  • Able to make difficult medical decisions under stressful circumstances    Advance Directives  These are legal documents you can create that will guide your healthcare team and decision maker(s) when needed. These documents can be stored in the electronic medical record.    · Living Will - a legal document to guide your care if you have a terminal condition or a serious illness and are unable to communicate. States vary by statute in document names/types, but most forms may include one or more of the following:        -  Directions regarding life-prolonging treatments        -  Directions regarding artificially provided nutrition/hydration        -  Choosing a healthcare decision maker        -  Direction  regarding organ/tissue donation    · Durable Power of  for Healthcare - this document names an -in-fact to make medical decisions for you, but it may also allow this person to make personal and financial decisions for you. Please seek the advice of an  if you need this type of document.    **Advance Directives are not required and no one may discriminate against you if you do not sign one.    Medical Orders  Many states allow specific forms/orders signed by your physician to record your wishes for medical treatment in your current state of health. This form, signed in personal communication with your physician, addresses resuscitation and other medical interventions that you may or may not want.      For more information or to schedule a time with a [facility] Advance Care Planning Facilitator contact: [email or web address]  Pulmonary Hypertension  Pulmonary hypertension is a long-term (chronic) condition in which there is high blood pressure in the arteries in the lungs (pulmonary arteries). This condition occurs when pulmonary arteries become narrow and tight, making it harder for blood to flow through the lungs. This in turn makes the heart work harder to pump blood through the lungs, making it harder for you to breathe.  Over time, pulmonary hypertension can weaken and damage the heart muscle, specifically the right side of the heart. Pulmonary hypertension is a serious condition that can be life-threatening.  What are the causes?  This condition may be caused by different medical conditions. It can be categorized by cause into five groups:  · Group 1: Pulmonary hypertension that is caused by abnormal growth of small blood vessels in the lungs (pulmonary arterial hypertension). The abnormal blood vessel growth may have no known cause, or it may be:  ? Passed from parent to child (hereditary).  ? Caused by another disease, such as a connective tissue disease (including lupus or  scleroderma), congenital heart disease, liver disease, or HIV.  ? Caused by certain medicines or poisons (toxins).  · Group 2: Pulmonary hypertension that is caused by weakness of the left chamber of the heart (left ventricle) or heart valve disease.  · Group 3: Pulmonary hypertension that is caused by lung disease or low oxygen levels. Causes in this group include:  ? Emphysema or chronic obstructive pulmonary disease (COPD).  ? Untreated sleep apnea.  ? Pulmonary fibrosis.  ? Long-term exposure to high altitudes in certain people who may already be at higher risk for pulmonary hypertension.  · Group 4: Pulmonary hypertension that is caused by blood clots in the lungs (pulmonary emboli).  · Group 5: Other causes of pulmonary hypertension, such as sickle cell anemia, sarcoidosis, tumors pressing on the pulmonary arteries, and various other diseases.  What are the signs or symptoms?  Symptoms of this condition include:  · Shortness of breath. You may notice shortness of breath with:  ? Activity, such as walking.  ? Minimal activity, such as getting dressed.  ? No activity, like when you are sitting still.  · A cough. Sometimes, bloody mucus from the lungs may be coughed up (hemoptysis).  · Tiredness and fatigue.  · Dizziness, lightheadedness, or fainting, especially with physical activity.  · Rapid heartbeat, or feeling your heart flutter or skip a beat (palpitations).  · Veins in the neck getting larger.  · Swelling of the lower legs, abdomen, or both.  · Bluish color of the lips and fingertips.  · Chest pain or tightness in the chest.  · Abdominal pain, especially in the upper abdomen.  How is this diagnosed?  This condition may be diagnosed based on one or more of the following tests:  · Chest X-ray.  · Blood tests.  · CT scan.  · Pulmonary function test. This test measures how much air your lungs can hold. It also tests how well air moves in and out of your lungs.  · 6-minute walk test. This tests how severe your  condition is in relation to your activity levels.  · Electrocardiogram (ECG). This test records the electrical impulses of the heart.  · Echocardiogram. This test uses sound waves (ultrasound) to produce an image of the heart.  · Cardiac catheterization. This is a procedure in which a thin tube (catheter) is passed into the pulmonary artery and used to test the pressure in your pulmonary artery and the right side of your heart.  · Lung biopsy. This involves having a procedure to remove a small sample of lung tissue for testing. This may help determine an underlying cause of your pulmonary hypertension.  How is this treated?  There is no cure for this condition, but treatment can help to relieve symptoms and slow the progress of the condition. Treatment may include:  · Cardiac rehabilitation. This is a treatment program that includes exercise training, education, and counseling to help you get stronger and return to an active lifestyle.  · Oxygen therapy.  · Medicines that:  ? Lower blood pressure.  ? Relax (dilate) the pulmonary blood vessels.  ? Help the heart beat more efficiently and pump more blood.  ? Help the body get rid of extra fluid (diuretics).  ? Thin the blood in order to prevent blood clots in the lungs.  · Lung surgery to relieve pressure on the heart, for severe cases that do not respond to medical treatment.  · Heart-lung transplant, or lung transplant. This may be done in very severe cases.  Follow these instructions at home:  Eating and drinking    · Eat a healthy diet that includes plenty of fresh fruits and vegetables, whole grains, and beans.  · Limit your salt (sodium) intake to less than 2,300 mg a day.  Lifestyle  · Do not use any products that contain nicotine or tobacco, such as cigarettes and e-cigarettes. If you need help quitting, ask your health care provider.  · Avoid secondhand smoke.  Activity  · Get plenty of rest.  · Exercise as directed. Talk with your health care provider about  what type of exercise is safe for you.  · Avoid hot tubs and saunas.  · Avoid high altitudes.  General instructions  · Take over-the-counter and prescription medicines only as told by your health care provider. Do not change or stop medicines without checking with your health care provider.  · Stay up to date on your vaccines, especially yearly flu (influenza) and pneumonia vaccines.  · If you are a woman of child-bearing age, avoid becoming pregnant. Talk with your health care provider about birth control.  · Consider ways to get support for anxiety and stress of living with pulmonary hypertension. Talk with your health care provider about support groups and online resources.  · Use oxygen therapy at home as directed.  · Keep track of your weight. Weight gain could be a sign that your condition is getting worse.  · Keep all follow-up visits as told by your health care provider. This is important.  Contact a health care provider if:  · Your cough gets worse.  · You have more shortness of breath than usual, or you start to have trouble doing activities that you could do before.  · You need to use medicines or oxygen more frequently or in higher dosages than usual.  Get help right away if:  · You have severe shortness of breath.  · You have chest pain or pressure.  · You cough up blood.  · You have swelling of your feet or legs that gets worse.  · You have rapid weight gain over a period of 1-2 days.  · Your medicines or oxygen do not provide relief.  Summary  · Pulmonary hypertension is a chronic condition in which there is high blood pressure in the arteries in the lungs (pulmonary arteries).  · Pulmonary hypertension is a serious condition that can be life-threatening. It can be caused by a variety of illnesses.  · Treatment may involve taking medicines and using oxygen therapy. Severe cases may require surgery or a transplant.  This information is not intended to replace advice given to you by your health care  provider. Make sure you discuss any questions you have with your health care provider.  Document Revised: 11/30/2018 Document Reviewed: 03/13/2018  [employer] Patient Education © 2021 [employer]    Tricuspid Valve Regurgitation    Tricuspid valve regurgitation, also called tricuspid regurgitation, is a heart condition in which some blood leaks back (regurgitates) through the valve that is located between the right atrium and the right ventricle (tricuspid valve). Blood returns to the heart through the right atrium. It normally flows through the tricuspid valve into the right ventricle.  If you have tricuspid valve regurgitation, some of the blood in the right ventricle leaks back into your right atrium whenever the ventricle pumps blood to your lungs. This can increase the amount of blood in the right atrium, causing the right atrium to become larger. As a result, pressure can change in the other chambers of your heart and in the surrounding blood vessels.  What are the causes?  Mild tricuspid valve regurgitation may not have a cause. It may be considered a normal condition. Causes of more severe regurgitation include:  · Any condition that increases the pressure in your lungs (pulmonary hypertension), such as:  ? Chronic obstructive pulmonary disease (COPD).  ? Lung disease.  ? Atrial septal defect (ASD).  · Heart failure.  · Cardiomyopathy.  · Heart attack.  · Other causes may include:  ? Valve injury from X-ray (radiation) treatment, such as for cancer.  ? Being born with a problem in the valve (congenital abnormality).  ? Heart valve infection (endocarditis).  ? Inflammation around joints and other organs (rheumatoid arthritis).  ? A condition that causes inflammation of the heart, blood vessels, or joints (rheumatic fever).  ? A condition that causes growths on the heart valve (carcinoid syndrome).  ? A genetic disorder that causes weak heart tissues (Marfan syndrome).  What are the signs or symptoms?  Often,  there are no symptoms. Severe tricuspid valve regurgitation may cause symptoms that include:  · Fatigue.  · Activity intolerance.  · Cough.  · Shortness of breath.  · Swelling of the feet, ankles, legs, or abdomen.  · Loss of appetite.  · Abdominal pain from an enlarged liver.  · Throbbing in the neck.  How is this diagnosed?  This condition may be diagnosed based on:  · Your symptoms. Your health care provider may suspect tricuspid valve regurgitation if you are having symptoms of the condition.  · A physical exam. Your health care provider may do a physical exam to check whether you have an abnormal heart sound (murmur).  · Tests to confirm the diagnosis. These may include:  ? An electrocardiogram (ECG) to measure the electrical rhythm of the heart.  ? A chest X-ray to see whether your heart is enlarged.  ? An imaging study (echocardiogram) to find out more information about your condition and how severe it is.  How is this treated?  Mild tricuspid valve regurgitation may not require treatment. If the condition becomes severe or causes symptoms, treatment may include:  · Being given oxygen.  · Medicines to help reduce the workload on your heart.  · Medicines to decrease fluid in the body (diuretics).  · Surgical treatment depends on the cause of your tricuspid valve regurgitation and may include:  ? Tricuspid valve repair. This may include reshaping the ring around the valve (annuloplasty).  ? Tricuspid valve replacement. This is a surgery to replace the tricuspid valve with an artificial (prosthetic) valve.  ? Transcatheter (minimally invasive) clip repair. The valve is repaired by placing a device (clip) onto the diseased valve. The device improves the blood flow through the valve and prevents leakage.  Follow these instructions at home:  Eating and drinking  · Eat a heart-healthy diet that includes whole grains, fruits and vegetables, lean proteins, and low-fat or nonfat dairy products.  · Limit how much salt  (sodium) you eat as told by your health care provider. Follow instructions from your health care provider about any other eating or drinking restrictions, such as limiting foods that are high in fat and processed sugars.  · Use healthy cooking methods, such as roasting, grilling, broiling, baking, poaching, steaming, or stir-frying.  Alcohol use  · Do not drink alcohol if:  ? Your health care provider tells you not to drink.  ? You are pregnant, may be pregnant, or are planning to become pregnant.  · If you drink alcohol:  ? Limit how much you use to:  § 0-1 drink a day for women.  § 0-2 drinks a day for men.  ? Be aware of how much alcohol is in your drink. In the [place] one drink equals one 12 oz bottle of beer (355 mL), one 5 oz glass of wine (148 mL), or one 1½ oz glass of hard liquor (44 mL).  Activity  · Return to your normal activities as told by your health care provider. Ask your health care provider what activities are safe for you.  · Regular exercise is important for the health of your heart and for maintaining a healthy weight. Ask your health care provider what type of exercise is safe for you.  General instructions  · Maintain a healthy weight.  · Take over-the-counter and prescription medicines only as told by your health care provider.  · Do not use any products that contain nicotine or tobacco, such as cigarettes, e-cigarettes, and chewing tobacco. If you need help quitting, ask your health care provider.  · Keep all follow-up visits as told by your health care provider. This is important.  Contact a health care provider if:  · You have signs of tricuspid valve regurgitation.  Get help right away if:  · You have chest pain or difficulty breathing.  These symptoms may represent a serious problem that is an emergency. Do not wait to see if the symptoms will go away. Get medical help right away. Call your local emergency services (911 in the [place] Do not drive yourself to the  [facility].  Summary  · Tricuspid valve regurgitation is a heart condition in which some blood leaks back (regurgitates) through the valve that is between your right atrium and right ventricle (tricuspid valve).  · Mild tricuspid valve regurgitation may not cause symptoms or require treatment.  · If tricuspid valve regurgitation becomes severe and causes symptoms, treatment may include medicines, valve repair, or valve replacement.  · Follow your health care provider's instructions on eating and drinking, doing activities, and making lifestyle changes.  · Get help right away if you have chest pain or difficulty breathing.  This information is not intended to replace advice given to you by your health care provider. Make sure you discuss any questions you have with your health care provider.  Document Revised: 10/02/2019 Document Reviewed: 10/02/2019  [employer] Patient Education © 2021 [employer]  m/ACP or call [phone] and someone will contact you directly.

## 2021-04-28 ENCOUNTER — TELEPHONE (OUTPATIENT)
Dept: CARDIOLOGY | Facility: CLINIC | Age: 86
End: 2021-04-28

## 2021-04-28 NOTE — TELEPHONE ENCOUNTER
Called daughter and spoke to her and advised her I spoke with Dr. Maki and he does not do tricuspid valves that is either Dr. Walsh or Dr. Boswell.  I did try to call their office today but it was already closed.  I advised patient's daughter I am just trying to find out if it was required for a right and left heart cath prior to her being referred or if they would just evaluate her based on the documentation we already have.  I advised once I know this I will let them know.

## 2021-04-29 ENCOUNTER — TELEPHONE (OUTPATIENT)
Dept: CARDIOLOGY | Facility: CLINIC | Age: 86
End: 2021-04-29

## 2021-04-29 NOTE — TELEPHONE ENCOUNTER
----- Message from IVONNE Newby sent at 4/29/2021 12:04 PM EDT -----  Can you call patient's daughter and ask her to go to [facility] and get the patient's Echo on CD.  Ask her to bring it to us so that I can send it to Dr. Walsh overnight to review.  Once Dr. Walsh has the CD she will determine best course of action.  Thanks!.    Just make sure she knows if she gets it tomorrow we are only here until noon.

## 2021-04-29 NOTE — TELEPHONE ENCOUNTER
Hillary Harrison, Dr. Walsh nurse called me back in regards to Ms. Allen.  She requested that we obtain the echocardiogram performed at [facility] on CD and have it forwarded to Dr. Walsh for evaluation.  She will then determine the best course of action for the patient.    I have sent a request to family to call the patient's daughter and let her know so that they can  the CD and bring it to our office as soon as possible for us to forward to Dr. Walsh.  We did not perform echo as we will be unable to obtain ourselves.

## 2021-04-29 NOTE — TELEPHONE ENCOUNTER
Pt's daughter called back and she was advised of the mess below . Daughter stated that she would go and get the Echo on a CD and bring it to our office .Daughter made aware that we close early on Fridays     BISI Recio

## 2021-04-30 DIAGNOSIS — I27.20 PULMONARY HYPERTENSION (HCC): Primary | ICD-10-CM

## 2021-04-30 NOTE — PROGRESS NOTES
Consult and treat [facility]- Dr. Walsh reviewed records 4/29/2021    Please see phone note dated 4/29/2021

## 2021-04-30 NOTE — TELEPHONE ENCOUNTER
Discussed the need for RHC with pt's daughter cyndi. She will discuss with PT. Offered either consult and treat RHC or they can come up for office consult then come back another day for RHC procedure. They will talk it over and let me know what they would like to do.

## 2021-04-30 NOTE — TELEPHONE ENCOUNTER
PT would like to proceed with C&T RHC. Order placed. Pt's daughter aware that scheduling will reach out early next to get set up.

## 2021-05-05 ENCOUNTER — TELEPHONE (OUTPATIENT)
Dept: CARDIOLOGY | Facility: CLINIC | Age: 86
End: 2021-05-05

## 2021-05-05 NOTE — TELEPHONE ENCOUNTER
Spoke with Dr. Walsh's nurse, Hillary.  They are going to perform RHC and instead of having stress, we will cancel and they will perform Right and left heart cath for patient.  Cancelling stress test.

## 2021-05-07 ENCOUNTER — PREP FOR SURGERY (OUTPATIENT)
Dept: OTHER | Facility: HOSPITAL | Age: 86
End: 2021-05-07

## 2021-05-07 DIAGNOSIS — R79.9 ABNORMAL FINDING OF BLOOD CHEMISTRY, UNSPECIFIED: ICD-10-CM

## 2021-05-07 DIAGNOSIS — I27.20 PULMONARY HYPERTENSION (HCC): Primary | ICD-10-CM

## 2021-05-07 RX ORDER — SODIUM CHLORIDE 0.9 % (FLUSH) 0.9 %
3 SYRINGE (ML) INJECTION EVERY 12 HOURS SCHEDULED
Status: CANCELLED | OUTPATIENT
Start: 2021-05-07

## 2021-05-07 RX ORDER — SODIUM CHLORIDE 9 MG/ML
3 INJECTION, SOLUTION INTRAVENOUS CONTINUOUS
Status: CANCELLED | OUTPATIENT
Start: 2021-05-07 | End: 2021-05-07

## 2021-05-07 RX ORDER — SODIUM CHLORIDE 0.9 % (FLUSH) 0.9 %
3-10 SYRINGE (ML) INJECTION AS NEEDED
Status: CANCELLED | OUTPATIENT
Start: 2021-05-07

## 2021-05-21 ENCOUNTER — APPOINTMENT (OUTPATIENT)
Dept: CARDIOLOGY | Facility: HOSPITAL | Age: 86
End: 2021-05-21

## 2021-05-21 ENCOUNTER — HOSPITAL ENCOUNTER (OUTPATIENT)
Facility: HOSPITAL | Age: 86
Setting detail: HOSPITAL OUTPATIENT SURGERY
Discharge: HOME | End: 2021-05-21
Attending: INTERNAL MEDICINE | Admitting: INTERNAL MEDICINE

## 2021-05-21 VITALS
BODY MASS INDEX: 21.91 KG/M2 | HEIGHT: 60 IN | WEIGHT: 111.6 LBS | HEART RATE: 47 BPM | OXYGEN SATURATION: 100 % | DIASTOLIC BLOOD PRESSURE: 57 MMHG | TEMPERATURE: 97.5 F | RESPIRATION RATE: 18 BRPM | SYSTOLIC BLOOD PRESSURE: 147 MMHG

## 2021-05-21 DIAGNOSIS — I27.20 PULMONARY HYPERTENSION (HCC): ICD-10-CM

## 2021-05-21 LAB
ALBUMIN SERPL-MCNC: 3.9 G/DL (ref 3.5–5.2)
ALBUMIN/GLOB SERPL: 1.1 G/DL
ALP SERPL-CCNC: 81 U/L (ref 39–117)
ALT SERPL W P-5'-P-CCNC: 16 U/L (ref 1–33)
ANION GAP SERPL CALCULATED.3IONS-SCNC: 14 MMOL/L (ref 5–15)
AST SERPL-CCNC: 25 U/L (ref 1–32)
ATMOSPHERIC PRESS: ABNORMAL MM[HG]
BASE EXCESS BLDV CALC-SCNC: 11 MMOL/L (ref -2–2)
BASE EXCESS BLDV CALC-SCNC: 11.1 MMOL/L (ref -2–2)
BASE EXCESS BLDV CALC-SCNC: 11.2 MMOL/L (ref -2–2)
BASE EXCESS BLDV CALC-SCNC: 11.2 MMOL/L (ref -2–2)
BASE EXCESS BLDV CALC-SCNC: 11.3 MMOL/L (ref -2–2)
BASOPHILS # BLD AUTO: 0.04 10*3/MM3 (ref 0–0.2)
BASOPHILS NFR BLD AUTO: 0.9 % (ref 0–1.5)
BDY SITE: ABNORMAL
BH CV REST NUCLEAR ISOTOPE DOSE: 29.9 MCI
BH CV STRESS BP STAGE 1: NORMAL
BH CV STRESS BP STAGE 4: NORMAL
BH CV STRESS COMMENTS STAGE 1: NORMAL
BH CV STRESS DOSE REGADENOSON STAGE 1: 0.4
BH CV STRESS DURATION MIN STAGE 1: 1
BH CV STRESS DURATION MIN STAGE 2: 1
BH CV STRESS DURATION MIN STAGE 3: 1
BH CV STRESS DURATION MIN STAGE 4: 1
BH CV STRESS DURATION SEC STAGE 1: 0
BH CV STRESS DURATION SEC STAGE 2: 0
BH CV STRESS DURATION SEC STAGE 3: 0
BH CV STRESS DURATION SEC STAGE 4: 0
BH CV STRESS HR STAGE 1: 50
BH CV STRESS HR STAGE 2: 60
BH CV STRESS HR STAGE 3: 52
BH CV STRESS HR STAGE 4: 56
BH CV STRESS NUCLEAR ISOTOPE DOSE: 29.8 MCI
BH CV STRESS O2 STAGE 2: 100
BH CV STRESS O2 STAGE 3: 100
BH CV STRESS O2 STAGE 4: 100
BH CV STRESS PROTOCOL 1: NORMAL
BH CV STRESS RECOVERY BP: NORMAL MMHG
BH CV STRESS RECOVERY HR: 52 BPM
BH CV STRESS RECOVERY O2: 100 %
BH CV STRESS STAGE 1: 1
BH CV STRESS STAGE 2: 2
BH CV STRESS STAGE 3: 3
BH CV STRESS STAGE 4: 4
BILIRUB SERPL-MCNC: 0.6 MG/DL (ref 0–1.2)
BODY TEMPERATURE: 37 C
BUN SERPL-MCNC: 89 MG/DL (ref 8–23)
BUN/CREAT SERPL: 43.4 (ref 7–25)
CALCIUM SPEC-SCNC: 10.1 MG/DL (ref 8.6–10.5)
CHLORIDE SERPL-SCNC: 93 MMOL/L (ref 98–107)
CHOLEST SERPL-MCNC: 101 MG/DL (ref 0–200)
CO2 BLDA-SCNC: 37.2 MMOL/L (ref 22–33)
CO2 BLDA-SCNC: 37.4 MMOL/L (ref 22–33)
CO2 BLDA-SCNC: 37.5 MMOL/L (ref 22–33)
CO2 BLDA-SCNC: 37.6 MMOL/L (ref 22–33)
CO2 BLDA-SCNC: 38.2 MMOL/L (ref 22–33)
CO2 SERPL-SCNC: 32 MMOL/L (ref 22–29)
COHGB MFR BLD: 1.4 %
COHGB MFR BLD: 1.4 %
COHGB MFR BLD: 1.5 %
CREAT SERPL-MCNC: 2.05 MG/DL (ref 0.57–1)
DEPRECATED RDW RBC AUTO: 51.2 FL (ref 37–54)
EOSINOPHIL # BLD AUTO: 0.07 10*3/MM3 (ref 0–0.4)
EOSINOPHIL NFR BLD AUTO: 1.5 % (ref 0.3–6.2)
EPAP: 0
ERYTHROCYTE [DISTWIDTH] IN BLOOD BY AUTOMATED COUNT: 14.6 % (ref 12.3–15.4)
GFR SERPL CREATININE-BSD FRML MDRD: 23 ML/MIN/1.73
GLOBULIN UR ELPH-MCNC: 3.5 GM/DL
GLUCOSE SERPL-MCNC: 103 MG/DL (ref 65–99)
HCO3 BLDV-SCNC: 35.8 MMOL/L (ref 22–28)
HCO3 BLDV-SCNC: 36 MMOL/L (ref 22–28)
HCO3 BLDV-SCNC: 36 MMOL/L (ref 22–28)
HCO3 BLDV-SCNC: 36.1 MMOL/L (ref 22–28)
HCO3 BLDV-SCNC: 36.6 MMOL/L (ref 22–28)
HCT VFR BLD AUTO: 32.2 % (ref 34–46.6)
HDLC SERPL-MCNC: 63 MG/DL (ref 40–60)
HGB BLD-MCNC: 10.1 G/DL (ref 12–15.9)
HGB BLDA-MCNC: 9.5 G/DL (ref 14–18)
HGB BLDA-MCNC: 9.6 G/DL (ref 14–18)
HGB BLDA-MCNC: 9.7 G/DL (ref 14–18)
HGB BLDA-MCNC: 9.8 G/DL (ref 14–18)
HGB BLDA-MCNC: 9.8 G/DL (ref 14–18)
IMM GRANULOCYTES # BLD AUTO: 0.02 10*3/MM3 (ref 0–0.05)
IMM GRANULOCYTES NFR BLD AUTO: 0.4 % (ref 0–0.5)
INHALED O2 CONCENTRATION: 28 %
IPAP: 0
LDLC SERPL CALC-MCNC: 26 MG/DL (ref 0–100)
LDLC/HDLC SERPL: 0.46 {RATIO}
LV EF NUC BP: 81 %
LYMPHOCYTES # BLD AUTO: 0.66 10*3/MM3 (ref 0.7–3.1)
LYMPHOCYTES NFR BLD AUTO: 14.3 % (ref 19.6–45.3)
MAGNESIUM SERPL-MCNC: 2.7 MG/DL (ref 1.6–2.4)
MAXIMAL PREDICTED HEART RATE: 136 BPM
MCH RBC QN AUTO: 30.3 PG (ref 26.6–33)
MCHC RBC AUTO-ENTMCNC: 31.4 G/DL (ref 31.5–35.7)
MCV RBC AUTO: 96.7 FL (ref 79–97)
METHGB BLD QL: 0.6 %
METHGB BLD QL: 0.7 %
METHGB BLD QL: 0.8 %
MODALITY: ABNORMAL
MONOCYTES # BLD AUTO: 0.7 10*3/MM3 (ref 0.1–0.9)
MONOCYTES NFR BLD AUTO: 15.2 % (ref 5–12)
NEUTROPHILS NFR BLD AUTO: 3.11 10*3/MM3 (ref 1.7–7)
NEUTROPHILS NFR BLD AUTO: 67.7 % (ref 42.7–76)
NOTE: ABNORMAL
NRBC BLD AUTO-RTO: 0 /100 WBC (ref 0–0.2)
OXYHGB MFR BLDV: 46.5 %
OXYHGB MFR BLDV: 50.1 %
OXYHGB MFR BLDV: 52.2 %
OXYHGB MFR BLDV: 53.4 %
OXYHGB MFR BLDV: 68.3 %
PAW @ PEAK INSP FLOW SETTING VENT: 0 CMH2O
PCO2 BLDV: 47.7 MM HG (ref 41–51)
PCO2 BLDV: 48.3 MM HG (ref 41–51)
PCO2 BLDV: 48.3 MM HG (ref 41–51)
PCO2 BLDV: 48.4 MM HG (ref 41–51)
PCO2 BLDV: 51.8 MM HG (ref 41–51)
PERCENT MAX PREDICTED HR: 44.12 %
PH BLDV: 7.46 PH UNITS (ref 7.31–7.41)
PH BLDV: 7.48 PH UNITS (ref 7.31–7.41)
PLATELET # BLD AUTO: 158 10*3/MM3 (ref 140–450)
PMV BLD AUTO: 9.9 FL (ref 6–12)
PO2 BLDV: 26.3 MM HG (ref 27–53)
PO2 BLDV: 28.5 MM HG (ref 27–53)
PO2 BLDV: 28.6 MM HG (ref 27–53)
PO2 BLDV: 29.2 MM HG (ref 27–53)
PO2 BLDV: 36.5 MM HG (ref 27–53)
POTASSIUM SERPL-SCNC: 2.9 MMOL/L (ref 3.5–5.2)
PROT SERPL-MCNC: 7.4 G/DL (ref 6–8.5)
RBC # BLD AUTO: 3.33 10*6/MM3 (ref 3.77–5.28)
SODIUM SERPL-SCNC: 139 MMOL/L (ref 136–145)
STRESS BASELINE BP: NORMAL MMHG
STRESS BASELINE HR: 48 BPM
STRESS O2 SAT REST: 100 %
STRESS PERCENT HR: 52 %
STRESS POST ESTIMATED WORKLOAD: 1 METS
STRESS POST EXERCISE DUR MIN: 4 MIN
STRESS POST EXERCISE DUR SEC: 45 SEC
STRESS POST O2 SAT PEAK: 100 %
STRESS POST PEAK BP: NORMAL MMHG
STRESS POST PEAK HR: 60 BPM
STRESS TARGET HR: 116 BPM
TOTAL RATE: 0 BREATHS/MINUTE
TRIGL SERPL-MCNC: 45 MG/DL (ref 0–150)
VLDLC SERPL-MCNC: 12 MG/DL (ref 5–40)
WBC # BLD AUTO: 4.6 10*3/MM3 (ref 3.4–10.8)

## 2021-05-21 PROCEDURE — 78431 MYOCRD IMG PET RST&STRS CT: CPT

## 2021-05-21 PROCEDURE — 83735 ASSAY OF MAGNESIUM: CPT | Performed by: INTERNAL MEDICINE

## 2021-05-21 PROCEDURE — 80053 COMPREHEN METABOLIC PANEL: CPT | Performed by: NURSE PRACTITIONER

## 2021-05-21 PROCEDURE — 93451 RIGHT HEART CATH: CPT | Performed by: INTERNAL MEDICINE

## 2021-05-21 PROCEDURE — 93017 CV STRESS TEST TRACING ONLY: CPT

## 2021-05-21 PROCEDURE — C1894 INTRO/SHEATH, NON-LASER: HCPCS | Performed by: INTERNAL MEDICINE

## 2021-05-21 PROCEDURE — 78492 MYOCRD IMG PET MLT RST&STRS: CPT

## 2021-05-21 PROCEDURE — A9555 RB82 RUBIDIUM: HCPCS | Performed by: INTERNAL MEDICINE

## 2021-05-21 PROCEDURE — 25010000002 FENTANYL CITRATE (PF) 50 MCG/ML SOLUTION: Performed by: INTERNAL MEDICINE

## 2021-05-21 PROCEDURE — 25010000002 NITRIC OXIDE GAS: Performed by: INTERNAL MEDICINE

## 2021-05-21 PROCEDURE — 82820 HEMOGLOBIN-OXYGEN AFFINITY: CPT

## 2021-05-21 PROCEDURE — 25010000002 MIDAZOLAM PER 1 MG: Performed by: INTERNAL MEDICINE

## 2021-05-21 PROCEDURE — 93018 CV STRESS TEST I&R ONLY: CPT | Performed by: INTERNAL MEDICINE

## 2021-05-21 PROCEDURE — 0 RUBIDIUM CHLORIDE: Performed by: INTERNAL MEDICINE

## 2021-05-21 PROCEDURE — 85025 COMPLETE CBC W/AUTO DIFF WBC: CPT | Performed by: NURSE PRACTITIONER

## 2021-05-21 PROCEDURE — C1751 CATH, INF, PER/CENT/MIDLINE: HCPCS | Performed by: INTERNAL MEDICINE

## 2021-05-21 PROCEDURE — 80061 LIPID PANEL: CPT | Performed by: NURSE PRACTITIONER

## 2021-05-21 PROCEDURE — 82805 BLOOD GASES W/O2 SATURATION: CPT

## 2021-05-21 PROCEDURE — 99236 HOSP IP/OBS SAME DATE HI 85: CPT | Performed by: INTERNAL MEDICINE

## 2021-05-21 PROCEDURE — 25010000002 REGADENOSON 0.4 MG/5ML SOLUTION: Performed by: INTERNAL MEDICINE

## 2021-05-21 PROCEDURE — 78492 MYOCRD IMG PET MLT RST&STRS: CPT | Performed by: INTERNAL MEDICINE

## 2021-05-21 RX ORDER — ALPRAZOLAM 0.25 MG/1
0.25 TABLET ORAL 3 TIMES DAILY PRN
Status: DISCONTINUED | OUTPATIENT
Start: 2021-05-21 | End: 2021-05-21 | Stop reason: HOSPADM

## 2021-05-21 RX ORDER — SODIUM CHLORIDE 0.9 % (FLUSH) 0.9 %
3-10 SYRINGE (ML) INJECTION AS NEEDED
Status: DISCONTINUED | OUTPATIENT
Start: 2021-05-21 | End: 2021-05-21 | Stop reason: HOSPADM

## 2021-05-21 RX ORDER — NALOXONE HCL 0.4 MG/ML
0.4 VIAL (ML) INJECTION
Status: DISCONTINUED | OUTPATIENT
Start: 2021-05-21 | End: 2021-05-21 | Stop reason: HOSPADM

## 2021-05-21 RX ORDER — ACETAMINOPHEN 325 MG/1
650 TABLET ORAL EVERY 4 HOURS PRN
Status: DISCONTINUED | OUTPATIENT
Start: 2021-05-21 | End: 2021-05-21 | Stop reason: HOSPADM

## 2021-05-21 RX ORDER — SODIUM CHLORIDE 9 MG/ML
100 INJECTION, SOLUTION INTRAVENOUS CONTINUOUS
Status: ACTIVE | OUTPATIENT
Start: 2021-05-21 | End: 2021-05-21

## 2021-05-21 RX ORDER — HYDROCODONE BITARTRATE AND ACETAMINOPHEN 7.5; 325 MG/1; MG/1
1 TABLET ORAL EVERY 4 HOURS PRN
Status: DISCONTINUED | OUTPATIENT
Start: 2021-05-21 | End: 2021-05-21 | Stop reason: HOSPADM

## 2021-05-21 RX ORDER — LIDOCAINE HYDROCHLORIDE 10 MG/ML
INJECTION, SOLUTION EPIDURAL; INFILTRATION; INTRACAUDAL; PERINEURAL AS NEEDED
Status: DISCONTINUED | OUTPATIENT
Start: 2021-05-21 | End: 2021-05-21 | Stop reason: HOSPADM

## 2021-05-21 RX ORDER — SODIUM CHLORIDE 9 MG/ML
3 INJECTION, SOLUTION INTRAVENOUS CONTINUOUS
Status: ACTIVE | OUTPATIENT
Start: 2021-05-21 | End: 2021-05-21

## 2021-05-21 RX ORDER — SODIUM CHLORIDE 0.9 % (FLUSH) 0.9 %
3 SYRINGE (ML) INJECTION EVERY 12 HOURS SCHEDULED
Status: DISCONTINUED | OUTPATIENT
Start: 2021-05-21 | End: 2021-05-21 | Stop reason: HOSPADM

## 2021-05-21 RX ORDER — MIDAZOLAM HYDROCHLORIDE 1 MG/ML
INJECTION INTRAMUSCULAR; INTRAVENOUS AS NEEDED
Status: DISCONTINUED | OUTPATIENT
Start: 2021-05-21 | End: 2021-05-21 | Stop reason: HOSPADM

## 2021-05-21 RX ORDER — NITRIC ACID 65% TO 70%
1 LIQUID (ML) MISCELLANEOUS ONCE
Status: COMPLETED | OUTPATIENT
Start: 2021-05-21 | End: 2021-05-21

## 2021-05-21 RX ORDER — ARFORMOTEROL TARTRATE 15 UG/2ML
15 SOLUTION RESPIRATORY (INHALATION)
COMMUNITY

## 2021-05-21 RX ORDER — POTASSIUM CHLORIDE 750 MG/1
40 CAPSULE, EXTENDED RELEASE ORAL AS NEEDED
Status: DISCONTINUED | OUTPATIENT
Start: 2021-05-21 | End: 2021-05-21 | Stop reason: HOSPADM

## 2021-05-21 RX ORDER — MORPHINE SULFATE 2 MG/ML
1 INJECTION, SOLUTION INTRAMUSCULAR; INTRAVENOUS EVERY 4 HOURS PRN
Status: DISCONTINUED | OUTPATIENT
Start: 2021-05-21 | End: 2021-05-21 | Stop reason: HOSPADM

## 2021-05-21 RX ORDER — FENTANYL CITRATE 50 UG/ML
INJECTION, SOLUTION INTRAMUSCULAR; INTRAVENOUS AS NEEDED
Status: DISCONTINUED | OUTPATIENT
Start: 2021-05-21 | End: 2021-05-21 | Stop reason: HOSPADM

## 2021-05-21 RX ADMIN — RUBIDIUM CHLORIDE RB-82 1 DOSE: 150 INJECTION, SOLUTION INTRAVENOUS at 12:43

## 2021-05-21 RX ADMIN — SODIUM CHLORIDE 3 ML/KG/HR: 9 INJECTION, SOLUTION INTRAVENOUS at 08:23

## 2021-05-21 RX ADMIN — POTASSIUM CHLORIDE 40 MEQ: 750 CAPSULE, EXTENDED RELEASE ORAL at 13:32

## 2021-05-21 RX ADMIN — RUBIDIUM CHLORIDE RB-82 1 DOSE: 150 INJECTION, SOLUTION INTRAVENOUS at 12:55

## 2021-05-21 RX ADMIN — Medication 1 EACH: at 09:35

## 2021-05-21 RX ADMIN — REGADENOSON 0.4 MG: 0.08 INJECTION, SOLUTION INTRAVENOUS at 12:52

## 2021-05-27 ENCOUNTER — TELEPHONE (OUTPATIENT)
Dept: CARDIOLOGY | Facility: CLINIC | Age: 86
End: 2021-05-27

## 2021-05-27 ENCOUNTER — DOCUMENTATION (OUTPATIENT)
Dept: CARDIOLOGY | Facility: CLINIC | Age: 86
End: 2021-05-27

## 2021-05-27 DIAGNOSIS — R00.1 BRADYCARDIA, SINUS: ICD-10-CM

## 2021-05-27 DIAGNOSIS — I48.0 PAROXYSMAL ATRIAL FIBRILLATION (HCC): Primary | ICD-10-CM

## 2021-05-27 NOTE — PROGRESS NOTES
I spoke with Justine Cruz MD on Monday regarding patient Kathleen Allen and today with IVONNE Juárez regarding Ms. Allen.    Ms. Allen underwent right heart catheterization and a PET scan on 5/21/2021.  She was found to have severe pulmonary hypertension with a PA systolic pressure of 75 mmHg.  On review of her echocardiogram her most recent echo was not that different from her echo in 2019 so this has been an ongoing problem for some time.  Her dyspnea is related to her pulmonary hypertension and not her tricuspid regurgitation.    Her cardiac output was 3.0 L/min with a cardiac index of 2.1 L/min/m² however this was with a heart rate of 40 bpm in atrial fibrillation.  Rates ranged from 31 to 50 bpm.    There was no favorable hemodynamic response to nitric oxide.    Her PET scan showed no ischemia.  Her GFR was 23 with a creatinine of 2.0.    I discontinued her atenolol which was only 25 mg daily.  I talked with Barbara Villarreal today regarding the possible need for a cardiac monitor to determine her rates now that she has been off atenolol for several days.  She may require pacemaker placement for symptomatic bradycardia.    Harper Walsh MD, FACC

## 2021-05-27 NOTE — TELEPHONE ENCOUNTER
----- Message from IVONNE Newby sent at 5/27/2021 12:46 PM EDT -----  Will you call patient and let her know that I spoke with Dr. Walsh and I am ordering her 30-day monitor that she is to have at this time next week.  Please let her know she can wear 24/7 showering swimming does not matter.  But she will wear it for 30 days.  And I have already put the order in.  Thank you        Called and spoke with pt regarding the above mess . Pt had concerns regarding the monitor she was advised to wear it for 30 days and if she needed help with the monitor that I could help her she stated that she doesn't want to come here , I suggest that she could go to Dr Tellez's office and I would contact U[place]ano his nurse and have her help with the monitor     BISI Recio

## 2021-05-27 NOTE — TELEPHONE ENCOUNTER
Dr. Walsh was called and says that she feels like her shortness of breath is actually just her pulmonary hypertension and not directly related to her valves.  She said she did a nitrous challenge and it did not make any improvement on her pulmonary pressures.  She did call and speak with Dr. Francisco regarding this.  She did go and stop her half of a tab of atenolol because she was 31-50 when they were doing the procedure.  She only did the right heart cath because she said her GFR was low in the 20s however her creatinine and creatinine clearance here has always been relatively low normal, therefore did not do a left heart cath but did a PET scan and it looked good.    We will order a monitor for the patient and let her know we will order it for 30 days to assess her heart rate.

## 2021-06-03 ENCOUNTER — TELEPHONE (OUTPATIENT)
Dept: CARDIOLOGY | Facility: CLINIC | Age: 86
End: 2021-06-03

## 2021-06-03 DIAGNOSIS — R53.81 MALAISE AND FATIGUE: ICD-10-CM

## 2021-06-03 DIAGNOSIS — R00.2 PALPITATIONS: ICD-10-CM

## 2021-06-03 DIAGNOSIS — I51.89 DIASTOLIC DYSFUNCTION: ICD-10-CM

## 2021-06-03 DIAGNOSIS — I73.9 PERIPHERAL ARTERIAL DISEASE (HCC): ICD-10-CM

## 2021-06-03 DIAGNOSIS — R06.02 SHORTNESS OF BREATH: ICD-10-CM

## 2021-06-03 DIAGNOSIS — I48.0 PAROXYSMAL ATRIAL FIBRILLATION (HCC): ICD-10-CM

## 2021-06-03 DIAGNOSIS — I10 ESSENTIAL HYPERTENSION: Primary | ICD-10-CM

## 2021-06-03 DIAGNOSIS — R53.83 MALAISE AND FATIGUE: ICD-10-CM

## 2021-06-24 ENCOUNTER — APPOINTMENT (OUTPATIENT)
Dept: CARDIOLOGY | Facility: HOSPITAL | Age: 86
End: 2021-06-24

## 2021-07-01 ENCOUNTER — TELEPHONE (OUTPATIENT)
Dept: CARDIOLOGY | Facility: CLINIC | Age: 86
End: 2021-07-01

## 2021-07-01 NOTE — TELEPHONE ENCOUNTER
----- Message from IVONNE Newby sent at 7/1/2021 11:11 AM EDT -----  Please advise patient, her FT3 is off and to f/u with PCP re: same. Thanks!        Pt was advised of lab results ( Free T3) pt advised to f/up with PCP     BISI Recio

## 2021-07-02 ENCOUNTER — TELEPHONE (OUTPATIENT)
Dept: CARDIOLOGY | Facility: CLINIC | Age: 86
End: 2021-07-02

## 2021-07-02 DIAGNOSIS — R06.02 SHORTNESS OF BREATH: ICD-10-CM

## 2021-07-02 DIAGNOSIS — R53.81 MALAISE AND FATIGUE: ICD-10-CM

## 2021-07-02 DIAGNOSIS — R00.2 PALPITATIONS: ICD-10-CM

## 2021-07-02 DIAGNOSIS — I48.0 PAROXYSMAL ATRIAL FIBRILLATION (HCC): ICD-10-CM

## 2021-07-02 DIAGNOSIS — R53.83 MALAISE AND FATIGUE: ICD-10-CM

## 2021-07-02 DIAGNOSIS — I10 ESSENTIAL HYPERTENSION: Primary | ICD-10-CM

## 2021-07-02 NOTE — TELEPHONE ENCOUNTER
----- Message from IVONNE Newby sent at 7/2/2021  8:40 AM EDT -----  Please advise patient.  CR has come down, still elevated.  Her Mg is a little high so if she is taking a vitamin with mag d/c for now.  If not avoid foods high in magnesium.  Forwarded labs to PCP.    K is still low, is she still taking KCL 20 meq PO 3 x a day?        Pt was advised of labs she is not taking any Mag or Vit with mag . She was advised to watch her intake on Mag Pt states she seen her Kidney Dr y/d and he put her on 4 tablets of Potassium y/d   Updated in chart     BISI Recio

## 2021-07-02 NOTE — TELEPHONE ENCOUNTER
Repeat labs in 2 weeks for BMP pt stated that she would have to ask her granddaughter she will contact me to let me know if she will repeat and if she does the labs where to fax order . Labs ordered

## 2021-07-06 ENCOUNTER — TELEPHONE (OUTPATIENT)
Dept: CARDIOLOGY | Facility: CLINIC | Age: 86
End: 2021-07-06

## 2021-07-06 DIAGNOSIS — I48.92 ATRIAL FLUTTER WITH CONTROLLED RESPONSE (HCC): ICD-10-CM

## 2021-07-06 DIAGNOSIS — I47.29 NSVT (NONSUSTAINED VENTRICULAR TACHYCARDIA) (HCC): ICD-10-CM

## 2021-07-06 DIAGNOSIS — I49.5 TACHY-BRADY SYNDROME (HCC): Primary | ICD-10-CM

## 2021-07-06 DIAGNOSIS — I48.0 PAROXYSMAL ATRIAL FIBRILLATION (HCC): ICD-10-CM

## 2021-07-06 NOTE — TELEPHONE ENCOUNTER
Per Barbara, call pt and let them know that they need to be referred to EP: see if they want to stay local (Dr. Kauffman) or stay in [facility] (Dr. James)

## 2021-07-06 NOTE — TELEPHONE ENCOUNTER
Tanisha called and informed pt, but pt asked for more info as to why she needs to see EP. Pt stated she would like local referral.     Pt's results were not yet scanned in, as Dr. Bailey hasn't reviewed them yet. 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       I spoke w/ pt. I explained why EP referral is needed, she verbalized understanding. I explained that Barbara would still be her cardiologist, she agreed to referral.       I updated referral to Dr. Kauffman.

## 2021-07-08 DIAGNOSIS — I47.29 NSVT (NONSUSTAINED VENTRICULAR TACHYCARDIA) (HCC): ICD-10-CM

## 2021-07-08 DIAGNOSIS — I49.5 TACHY-BRADY SYNDROME (HCC): Primary | ICD-10-CM

## 2021-07-08 DIAGNOSIS — I48.0 PAROXYSMAL ATRIAL FIBRILLATION (HCC): ICD-10-CM

## 2021-07-08 DIAGNOSIS — R00.1 BRADYCARDIA, SINUS: ICD-10-CM

## 2021-07-08 DIAGNOSIS — I48.92 ATRIAL FLUTTER WITH CONTROLLED RESPONSE (HCC): ICD-10-CM

## 2021-07-08 NOTE — PROGRESS NOTES
Pt called the Barbara line and spoke w/ Tanisha, whom spoke w/ Barbara. An ASAP appt for Dr. Kauffman was entered.     As Tanisha was ending the call, pt mentioned her 02 being 86 and stated that it wouldn't come up and she has COPD. Stated she had done her nebulizing treatment already and would get very SoA w/ any activity.     I spoke w/ pt and asked if she has any 02 at home, she stated she wears it continuously at HS and has it for day use PRN. I advised her to put on her 02 until her 02 sat is at least 93% I advised her to callback or go to the ER if it doesn't improved. I explained the sx of respiratory distress and stated to go directly to ER if they occur, she verbalized understanding.

## 2023-01-04 NOTE — TELEPHONE ENCOUNTER
Pt called back and states that she hit the wrong button when she was getting out of bed due to her breathing . She has to do her breathing txt  First before she can really think.  She states that she had labs done in [place] and the 1st of July she has to have more labs .  She states that she is ok and and doesn't have any fluttering or anything that she has noticed . She stated that she was stressed out with the monitor and she feels like she hit the wrong button , due to they tried several times to put the monitor on and it kept saying poor connection and she kept moving the strips around and it was tearing her skin. She wants to know if she has to do the lab repeat . She stated that Dr Tellez is on vacation I explained to her that she does not have to see the Dr to get labs she then states its hard to get a ride . I ask her if she wants to go to [facility] bc she can go at any time to get labs there . I advised her that I would speak with Barbara and get back with her regarding the lab repeat       BISI Recio   
Pt was advised regarding the below mess from IVONNE Juárez     Yes her labs in [place] were bad that is why I want to repeat them.  Where is she having her labs in July?  If they are the same then we can wait, if not then she needs to take our orders with her to get them done as well.  Thanks!         Pt states that she will repeat labs in July when she has other labs done she ask if we would mail her the lab orders     BISI Recio     Labs mailed     
Received Monitor report , PVC and A-Fib on 06/02/2021      Per IVONNE Juárez    Pt needs symptom check . Pt will need labs repeated ( Bmp, TSH, Free T4, Free T3, and Mag)      Pt states she hit the wrong button , she has a hard time in the am with her breathing seh will call me back due to she is confused and states that she needs to take her breathing txt first . Pt was just getting out of the bed . She was advised that she needs labs repeated and where she would like to go . She will call back with symptom check and let me know where she wants labs done or faxed to       BISI Recio   
PAST MEDICAL HISTORY:  Carotid stenosis     Diabetes non insulin dependent    Diabetes mellitus     High cholesterol     HLD (hyperlipidemia)     Hypertension     Hypothyroid     Obesity     Seasonal allergies

## 2024-10-22 NOTE — PATIENT INSTRUCTIONS
Advance Care Planning and Advance Directives     You make decisions on a daily basis - decisions about where you want to live, your career, your home, your life. Perhaps one of the most important decisions you face is your choice for future medical care. Take time to talk with your family and your healthcare team and start planning today.  Advance Care Planning is a process that can help you:  · Understand possible future healthcare decisions in light of your own experiences  · Reflect on those decision in light of your goals and values  · Discuss your decisions with those closest to you and the healthcare professionals that care for you  · Make a plan by creating a document that reflects your wishes    Surrogate Decision Maker  In the event of a medical emergency, which has left you unable to communicate or to make your own decisions, you would need someone to make decisions for you.  It is important to discuss your preferences for medical treatment with this person while you are in good health.     Qualities of a surrogate decision maker:  • Willing to take on this role and responsibility  • Knows what you want for future medical care  • Willing to follow your wishes even if they don't agree with them  • Able to make difficult medical decisions under stressful circumstances    Advance Directives  These are legal documents you can create that will guide your healthcare team and decision maker(s) when needed. These documents can be stored in the electronic medical record.    · Living Will - a legal document to guide your care if you have a terminal condition or a serious illness and are unable to communicate. States vary by statute in document names/types, but most forms may include one or more of the following:        -  Directions regarding life-prolonging treatments        -  Directions regarding artificially provided nutrition/hydration        -  Choosing a healthcare decision maker        -  Direction  regarding organ/tissue donation    · Durable Power of  for Healthcare - this document names an -in-fact to make medical decisions for you, but it may also allow this person to make personal and financial decisions for you. Please seek the advice of an  if you need this type of document.    **Advance Directives are not required and no one may discriminate against you if you do not sign one.    Medical Orders  Many states allow specific forms/orders signed by your physician to record your wishes for medical treatment in your current state of health. This form, signed in personal communication with your physician, addresses resuscitation and other medical interventions that you may or may not want.      For more information or to schedule a time with a [facility] Advance Care Planning Facilitator contact: [email or web address] or call [phone] and someone will contact you directly.   Statement Selected

## (undated) DEVICE — SWAN-GANZ THERMODILUTION CATHETER: Brand: SWAN-GANZ

## (undated) DEVICE — CVR HNDL LT SURG ACCSSRY BLU STRL

## (undated) DEVICE — CATH GUIDE SOFTVU FLUSH HT PIG .035 4F 65CM

## (undated) DEVICE — GLIDEX™ COATED HYDROPHILIC GUIDEWIRE: Brand: MAGIC TORQUE™

## (undated) DEVICE — TOWEL,OR,DSP,ST,BLUE,STD,8/PK,10PK/CS: Brand: MEDLINE

## (undated) DEVICE — SYR CONTRL LUERLOK 10CC

## (undated) DEVICE — AVANTI + 4F STD W/GW: Brand: AVANTI

## (undated) DEVICE — DRSNG SURESITE WNDW 4X4.5

## (undated) DEVICE — INTENDED FOR TISSUE SEPARATION, AND OTHER PROCEDURES THAT REQUIRE A SHARP SURGICAL BLADE TO PUNCTURE OR CUT.: Brand: BARD-PARKER ® STAINLESS STEEL BLADES

## (undated) DEVICE — SKIN AFFIX SURG ADHESIVE 72/CS 0.55ML: Brand: MEDLINE

## (undated) DEVICE — NDL HYPO ECLPS SFTY 22G 1 1/2IN

## (undated) DEVICE — PK ANGIO OR 10

## (undated) DEVICE — FOGARTY - HYDRAGRIP SURGICAL - CLAMP INSERTS: Brand: FOGARTY SOFTJAW

## (undated) DEVICE — ANTIBACTERIAL UNDYED BRAIDED (POLYGLACTIN 910), SYNTHETIC ABSORBABLE SUTURE: Brand: COATED VICRYL

## (undated) DEVICE — KT MANIFOLD CATHLAB CUST

## (undated) DEVICE — SI AVANTI+ 7F STD W/GW  NO OBT: Brand: AVANTI

## (undated) DEVICE — TBG INJ CONTRL EXCITE RA 1200PSI 48IN

## (undated) DEVICE — DRSNG SURG AQUACEL AG 9X30CM

## (undated) DEVICE — GAUZE,SPONGE,4"X4",16PLY,XRAY,STRL,LF: Brand: MEDLINE

## (undated) DEVICE — SUT SILK 0/0 CT2 18IN C027D

## (undated) DEVICE — SUCTION CANISTER, 2500CC, RIGID: Brand: DEROYAL

## (undated) DEVICE — GLV SURG BIOGEL LTX PF 7 1/2

## (undated) DEVICE — PK VASC 10

## (undated) DEVICE — 3M™ STERI-DRAPE™ FLUOROSCOPE DRAPE, 10 PER CARTON / 4 CARTONS PER CASE, 1012: Brand: STERI-DRAPE™

## (undated) DEVICE — 3M™ IOBAN™ 2 ANTIMICROBIAL INCISE DRAPE 6651EZ: Brand: IOBAN™ 2

## (undated) DEVICE — SUT PROLN 6/0 C1 D/A 30IN 8706H

## (undated) DEVICE — SYS SKIN CLS DERMABOND PRINEO W/22CM MESH TP

## (undated) DEVICE — SUT SILK 3/0 TIES 18IN A184H

## (undated) DEVICE — SUT PROLN SURGILENE 5.0 24IN BLU 9702H

## (undated) DEVICE — GLV SURG BIOGEL LTX PF 8

## (undated) DEVICE — SI AVANTI+ 6F STD W/GW  NO OBT: Brand: AVANTI

## (undated) DEVICE — SEALANT HEMO TACHOSIL FIBRIN PTCH 9.5X4.8CM

## (undated) DEVICE — DRSNG SURG AQUACEL AG 9X15CM

## (undated) DEVICE — SUT SILK 2/0 TIES 18IN A185H

## (undated) DEVICE — PINNACLE INTRODUCER SHEATH: Brand: PINNACLE

## (undated) DEVICE — SUT PROLN 7/0 BV1 D/A 24IN 8702H

## (undated) DEVICE — NDL PERC 1PRT THNWALL W/BASEPLT 18G 7CM

## (undated) DEVICE — DECANT BG O JET